# Patient Record
Sex: FEMALE | Race: WHITE | NOT HISPANIC OR LATINO | Employment: OTHER | URBAN - METROPOLITAN AREA
[De-identification: names, ages, dates, MRNs, and addresses within clinical notes are randomized per-mention and may not be internally consistent; named-entity substitution may affect disease eponyms.]

---

## 2017-04-21 ENCOUNTER — TRANSCRIBE ORDERS (OUTPATIENT)
Dept: LAB | Facility: CLINIC | Age: 47
End: 2017-04-21

## 2017-04-21 ENCOUNTER — APPOINTMENT (OUTPATIENT)
Dept: LAB | Facility: CLINIC | Age: 47
End: 2017-04-21
Payer: COMMERCIAL

## 2017-04-21 DIAGNOSIS — G35 MULTIPLE SCLEROSIS (HCC): ICD-10-CM

## 2017-04-21 LAB
ALBUMIN SERPL BCP-MCNC: 3.5 G/DL (ref 3.5–5)
ALP SERPL-CCNC: 73 U/L (ref 46–116)
ALT SERPL W P-5'-P-CCNC: 31 U/L (ref 12–78)
AST SERPL W P-5'-P-CCNC: 28 U/L (ref 5–45)
BASOPHILS # BLD AUTO: 0.03 THOUSANDS/ΜL (ref 0–0.1)
BASOPHILS NFR BLD AUTO: 1 % (ref 0–1)
BILIRUB DIRECT SERPL-MCNC: 0.14 MG/DL (ref 0–0.2)
BILIRUB SERPL-MCNC: 0.4 MG/DL (ref 0.2–1)
EOSINOPHIL # BLD AUTO: 0.05 THOUSAND/ΜL (ref 0–0.61)
EOSINOPHIL NFR BLD AUTO: 1 % (ref 0–6)
ERYTHROCYTE [DISTWIDTH] IN BLOOD BY AUTOMATED COUNT: 13.6 % (ref 11.6–15.1)
HCT VFR BLD AUTO: 43.2 % (ref 34.8–46.1)
HGB BLD-MCNC: 14.3 G/DL (ref 11.5–15.4)
LYMPHOCYTES # BLD AUTO: 1.93 THOUSANDS/ΜL (ref 0.6–4.47)
LYMPHOCYTES NFR BLD AUTO: 31 % (ref 14–44)
MCH RBC QN AUTO: 30.7 PG (ref 26.8–34.3)
MCHC RBC AUTO-ENTMCNC: 33.1 G/DL (ref 31.4–37.4)
MCV RBC AUTO: 93 FL (ref 82–98)
MONOCYTES # BLD AUTO: 0.53 THOUSAND/ΜL (ref 0.17–1.22)
MONOCYTES NFR BLD AUTO: 9 % (ref 4–12)
NEUTROPHILS # BLD AUTO: 3.69 THOUSANDS/ΜL (ref 1.85–7.62)
NEUTS SEG NFR BLD AUTO: 58 % (ref 43–75)
PLATELET # BLD AUTO: 250 THOUSANDS/UL (ref 149–390)
PMV BLD AUTO: 9.9 FL (ref 8.9–12.7)
PROT SERPL-MCNC: 7.7 G/DL (ref 6.4–8.2)
RBC # BLD AUTO: 4.66 MILLION/UL (ref 3.81–5.12)
WBC # BLD AUTO: 6.23 THOUSAND/UL (ref 4.31–10.16)

## 2017-04-21 PROCEDURE — 85025 COMPLETE CBC W/AUTO DIFF WBC: CPT

## 2017-04-21 PROCEDURE — 36415 COLL VENOUS BLD VENIPUNCTURE: CPT

## 2017-04-21 PROCEDURE — 80076 HEPATIC FUNCTION PANEL: CPT

## 2017-04-26 ENCOUNTER — TRANSCRIBE ORDERS (OUTPATIENT)
Dept: ADMINISTRATIVE | Facility: HOSPITAL | Age: 47
End: 2017-04-26

## 2017-04-26 ENCOUNTER — ALLSCRIPTS OFFICE VISIT (OUTPATIENT)
Dept: OTHER | Facility: OTHER | Age: 47
End: 2017-04-26

## 2017-04-26 DIAGNOSIS — G35 MULTIPLE SCLEROSIS (HCC): Primary | ICD-10-CM

## 2017-04-26 DIAGNOSIS — G35 MULTIPLE SCLEROSIS (HCC): ICD-10-CM

## 2017-06-01 ENCOUNTER — GENERIC CONVERSION - ENCOUNTER (OUTPATIENT)
Dept: OTHER | Facility: OTHER | Age: 47
End: 2017-06-01

## 2017-06-02 ENCOUNTER — APPOINTMENT (OUTPATIENT)
Dept: LAB | Facility: CLINIC | Age: 47
End: 2017-06-02
Payer: COMMERCIAL

## 2017-06-02 ENCOUNTER — TRANSCRIBE ORDERS (OUTPATIENT)
Dept: LAB | Facility: CLINIC | Age: 47
End: 2017-06-02

## 2017-06-02 DIAGNOSIS — N95.1 SYMPTOMATIC MENOPAUSAL OR FEMALE CLIMACTERIC STATES: ICD-10-CM

## 2017-06-02 DIAGNOSIS — N95.1 SYMPTOMATIC MENOPAUSAL OR FEMALE CLIMACTERIC STATES: Primary | ICD-10-CM

## 2017-06-02 LAB
FSH SERPL-ACNC: 0.5 MIU/ML
T4 SERPL-MCNC: 11 UG/DL (ref 4.7–13.3)
TSH SERPL DL<=0.05 MIU/L-ACNC: 2.08 UIU/ML (ref 0.36–3.74)

## 2017-06-02 PROCEDURE — 84443 ASSAY THYROID STIM HORMONE: CPT

## 2017-06-02 PROCEDURE — 83001 ASSAY OF GONADOTROPIN (FSH): CPT

## 2017-06-02 PROCEDURE — 84436 ASSAY OF TOTAL THYROXINE: CPT

## 2017-06-02 PROCEDURE — 36415 COLL VENOUS BLD VENIPUNCTURE: CPT

## 2017-06-14 ENCOUNTER — GENERIC CONVERSION - ENCOUNTER (OUTPATIENT)
Dept: OTHER | Facility: OTHER | Age: 47
End: 2017-06-14

## 2017-09-21 ENCOUNTER — TRANSCRIBE ORDERS (OUTPATIENT)
Dept: LAB | Facility: CLINIC | Age: 47
End: 2017-09-21

## 2017-09-21 ENCOUNTER — APPOINTMENT (OUTPATIENT)
Dept: LAB | Facility: CLINIC | Age: 47
End: 2017-09-21
Payer: COMMERCIAL

## 2017-09-21 DIAGNOSIS — G35 MULTIPLE SCLEROSIS (HCC): ICD-10-CM

## 2017-09-21 LAB
ALBUMIN SERPL BCP-MCNC: 3.4 G/DL (ref 3.5–5)
ALP SERPL-CCNC: 56 U/L (ref 46–116)
ALT SERPL W P-5'-P-CCNC: 40 U/L (ref 12–78)
AST SERPL W P-5'-P-CCNC: 27 U/L (ref 5–45)
BASOPHILS # BLD AUTO: 0.02 THOUSANDS/ΜL (ref 0–0.1)
BASOPHILS NFR BLD AUTO: 0 % (ref 0–1)
BILIRUB DIRECT SERPL-MCNC: 0.09 MG/DL (ref 0–0.2)
BILIRUB SERPL-MCNC: 0.4 MG/DL (ref 0.2–1)
BUN SERPL-MCNC: 17 MG/DL (ref 5–25)
CREAT SERPL-MCNC: 0.78 MG/DL (ref 0.6–1.3)
EOSINOPHIL # BLD AUTO: 0.04 THOUSAND/ΜL (ref 0–0.61)
EOSINOPHIL NFR BLD AUTO: 1 % (ref 0–6)
ERYTHROCYTE [DISTWIDTH] IN BLOOD BY AUTOMATED COUNT: 13.4 % (ref 11.6–15.1)
GFR SERPL CREATININE-BSD FRML MDRD: 90 ML/MIN/1.73SQ M
HCT VFR BLD AUTO: 41.9 % (ref 34.8–46.1)
HGB BLD-MCNC: 13.6 G/DL (ref 11.5–15.4)
LYMPHOCYTES # BLD AUTO: 1.61 THOUSANDS/ΜL (ref 0.6–4.47)
LYMPHOCYTES NFR BLD AUTO: 28 % (ref 14–44)
MCH RBC QN AUTO: 30.6 PG (ref 26.8–34.3)
MCHC RBC AUTO-ENTMCNC: 32.5 G/DL (ref 31.4–37.4)
MCV RBC AUTO: 94 FL (ref 82–98)
MONOCYTES # BLD AUTO: 0.51 THOUSAND/ΜL (ref 0.17–1.22)
MONOCYTES NFR BLD AUTO: 9 % (ref 4–12)
NEUTROPHILS # BLD AUTO: 3.5 THOUSANDS/ΜL (ref 1.85–7.62)
NEUTS SEG NFR BLD AUTO: 62 % (ref 43–75)
PLATELET # BLD AUTO: 265 THOUSANDS/UL (ref 149–390)
PMV BLD AUTO: 10.1 FL (ref 8.9–12.7)
PROT SERPL-MCNC: 7.2 G/DL (ref 6.4–8.2)
RBC # BLD AUTO: 4.45 MILLION/UL (ref 3.81–5.12)
WBC # BLD AUTO: 5.68 THOUSAND/UL (ref 4.31–10.16)

## 2017-09-21 PROCEDURE — 85025 COMPLETE CBC W/AUTO DIFF WBC: CPT

## 2017-09-21 PROCEDURE — 36415 COLL VENOUS BLD VENIPUNCTURE: CPT

## 2017-09-21 PROCEDURE — 82565 ASSAY OF CREATININE: CPT

## 2017-09-21 PROCEDURE — 80076 HEPATIC FUNCTION PANEL: CPT

## 2017-09-21 PROCEDURE — 84520 ASSAY OF UREA NITROGEN: CPT

## 2017-10-06 ENCOUNTER — HOSPITAL ENCOUNTER (OUTPATIENT)
Dept: MRI IMAGING | Facility: HOSPITAL | Age: 47
Discharge: HOME | End: 2017-10-06
Attending: PSYCHIATRY & NEUROLOGY
Payer: COMMERCIAL

## 2017-10-06 DIAGNOSIS — G35 MULTIPLE SCLEROSIS (HCC): ICD-10-CM

## 2017-10-06 PROCEDURE — A9585 GADOBUTROL INJECTION: HCPCS | Performed by: PSYCHIATRY & NEUROLOGY

## 2017-10-06 PROCEDURE — 70553 MRI BRAIN STEM W/O & W/DYE: CPT

## 2017-10-06 RX ADMIN — GADOBUTROL 6 ML: 604.72 INJECTION INTRAVENOUS at 08:20

## 2017-10-17 ENCOUNTER — GENERIC CONVERSION - ENCOUNTER (OUTPATIENT)
Dept: OTHER | Facility: OTHER | Age: 47
End: 2017-10-17

## 2017-10-17 DIAGNOSIS — G31.9 DEGENERATIVE DISEASE OF NERVOUS SYSTEM (HCC): ICD-10-CM

## 2017-10-26 ENCOUNTER — GENERIC CONVERSION - ENCOUNTER (OUTPATIENT)
Dept: OTHER | Facility: OTHER | Age: 47
End: 2017-10-26

## 2017-11-06 ENCOUNTER — ALLSCRIPTS OFFICE VISIT (OUTPATIENT)
Dept: OTHER | Facility: OTHER | Age: 47
End: 2017-11-06

## 2018-01-12 VITALS
RESPIRATION RATE: 16 BRPM | HEART RATE: 76 BPM | HEIGHT: 69 IN | DIASTOLIC BLOOD PRESSURE: 70 MMHG | WEIGHT: 165 LBS | SYSTOLIC BLOOD PRESSURE: 110 MMHG | BODY MASS INDEX: 24.44 KG/M2

## 2018-01-14 NOTE — PROGRESS NOTES
Assessment    1  Multiple sclerosis (340) (G35)   2  Right foot drop (736 79) (M21 371)    Plan  Long term use of drug    · (1) VITAMIN D 25-HYDROXY; Status:Active; Requested for:29Mar2016;    Perform:[facility]; LXT:[id number];ZHBJAQE; For:Long term use of drug; Ordered By:Kurt Hurtado; Long term use of drug, Multiple sclerosis    · (1) CBC/PLT/DIFF; Status:Active; Requested for:29Mar2016;    Perform:[facility]; DFW:[id number];ZIKGYPP; For:Long term use of drug, Multiple sclerosis; Ordered By:Kurt Hurtado;   · (1) COMPREHENSIVE METABOLIC PANEL; Status:Active; Requested for:29Mar2016;    Perform:[facility]; PNR:[id number];[id number]; For:Long term use of drug, Multiple sclerosis; Ordered By:Kurt Hurtado;  Multiple sclerosis    · Follow-up visit in 6 months Evaluation and Treatment  Follow-up  Status: Complete   Done: [id number]   Ordered; For: Multiple sclerosis; Ordered By: Fabien Jj Performed:  Due: 96WEL4671; Last Updated By: Herminio Posey; 3/29/2016 1:06:31 PM    Discussion/Summary  Discussion Summary:   Pt overall doing well, clinically stable  On rebif  updated MRI cervical stable  no new lesions, no enh   updated MRI Brain no enh  notes one new subactue lesion compared to 2002 study  Asked  to obtain MRI Disc from 2012 outside study to use for more recent comparison, to be loaded into PACS  pt using bioness stim for the right foot  doing very well   needs updated labs  following with optho in Sept  discussed issues with bowel, suggested bowel program with Fiber, If ongoing issues pt to call will consider GI or neuroGYN for stim  pt to call for any new sxs  Medication Side Effects Reviewed: Possible side effects of new medications were reviewed with the patient/guardian today  Patient Guardian understands agrees: The treatment plan was reviewed with the patient/guardian   The patient/guardian understands and agrees with the treatment plan Counseling Documentation With Imm: The patient, patient's family was counseled regarding diagnostic results, instructions for management, risk factor reductions, prognosis, patient and family education  total time of encounter was 25 minutes and >50 minutes was spent counseling  Chief Complaint  Chief Complaint Free Text Note Form: patient present for f/u appt      History of Present Illness  HPI: 55 y/o female presents for MS follow up  PT DIAGNOSED WITH MS IN 1993 , H/O HYPERCHOLESTEROLEMIA  PT NOTED INTI AL SXS WERE NUMBNESS OF HANDS AND TOES, DIFF WITH BALANCE  PT ALSO WITH DIFF WITH SHORT TERM MEMORY , UNABLE TO WORK JENS DUE TO COGNITIVE DIFF  PT RELIES ON SPOUSE FOR MUCH OF HISTORY AS WELL AS COOKING AND ADLS  LAST IV STEROIDS OVER 12 YEARS AGO, SOME DIFF WITH THE STEROIDS   PT WAS ORIGINALLY ON BETASERON FOR 8 YEARS AT TIME OF DIAGNOSIS, DUE TO DECREASED EFFICACY PT WAS THEN CHANGED TO COPAXONE AND ON THAT MED FOR ABOUT 7 YEARS  PT THEN CHANGED TO REBIF  PT HAD UPDATED LABS ON Feb 2015 CBC & LFTS WNL Pt follows with Dr Hwang of urology for bladder & urinary issues- no change in longstanding issues  PT DOES NOTE SOME INCREASED PAIN IN THE RIGHT LEG  PT LAST ABLE TO RUN ABOUT 1 5 YRS AGO  PT NOTES NO BACK PAIN  PT WITH PRIMARY WEAKNESS OF RIGHT LE, WAS ABLE TO OBTAIN Bioness device WITH GREAT IMPROVEMENT        , last seen in October  At that time clinically stable  Today she is doing well  No s/s of exacerbation  She remains on Rebif, no significant site reaction difficulty  PT has one area of lipoatrophy long standing and pt avoids this area in the right upper arm  She is here with her   She does note some ongoing issues with imbalance, no reported falls  With fatigue she will have a tendency to drag the right foot  She had great success with the Bioness  She states that she has been starting back to do some running   She has ongoing bladder issues she does describe some problems with inability at times to have a complete evacuation of her bowels  No UTI  NO change in her vision, she is due for optho exam in Sept  She did have updated imaging done 11/27/16: Multiple supratentorial white matter T2 and FLAIR hyperintense foci consistent with chronic MS and development of a subacute plaque at the left frontoparietal convexity  No pathologically enhancing lesions identified  This was compared to a prior MRI from 2002  MRI cervical spine Multiple spinal cord lesions consistent with multiple sclerosis  Mild annular bulges C5-6 and C6-7 without evidence of disc herniation, central canal or foraminal stenosis, No pathologic enhancement, Similar to previous MRI study from 2014  pt notes no new sxs  pt denies any vertigo  l  pt is not interested in changed imd meds at this time  pt is due for updated labs  last labs by pcp [facility] in feb 2015  ROS reviewed with Pt  Review of Systems  Neurological ROS:   Constitutional: recent weight loss and fatigue  HEENT: sinus problems  Genitourinary: feelings of urinary urgency  Psychiatric: depression, mood swings and sleep problems  Endocrine menstrual period change or irregularity and loss of sexual ability or drive   Hematologic/Lymphatic: a tendency for easy bruising  Neurological General: increased sleepiness and snoring  Neurological Mental Status: confusion and memory problems  Neurological Cranial Nerves: slurred speech  Neurological Coordination: balance difficulties  Neurological Sensory: numbness and tingling  Neurological Gait: difficulty walking and has had falls  Active Problems    1  Abnormal gait (781 2) (R26 9)   2  Acute sinusitis (461 9) (J01 90)   3  Flu vaccine need (V04 81) (Z23)   4  Long term use of drug (V58 69) (Z79 899)   5  Multiple sclerosis (340) (G35)   6  Neurogenic bladder (596 54) (N31 9)   7  Right foot drop (736 79) (M21 371)   8  Transient amnesia (780 93) (R41 3)   9   Urinary frequency (788 41) (R35 0)   10  Urinary tract infection (599 0) (N39 0)   11  Weight loss (783 21) (R63 4)    Surgical History    1  History of Oral Surgery Tooth Extraction    Family History    1  Family history of Heart Disease (V17 49)   2  Family history of Hypertension   3  Family history of Liver, Stomach, Or Bowel Disease    4  Family history of Colon cancer, ascending   5  Family history of DM2 (diabetes mellitus, type 2)    Social History    · Being A Social Drinker   · Caffeine Use   · Daily Cola Consumption (1  Cans/Day)   · Denied: History of Drug Use   · Marital History - Currently    · Never A Smoker    Current Meds   1  Fish Oil 1200 MG Oral Capsule; Therapy: (Recorded:06May2015) to Recorded   2  Nitrofurantoin Monohyd Macro 100 MG Oral Capsule; 100 mg after intercourse to   prevent/treat infection; Therapy: [id number] to (Jluis Godwin)  Requested for: 82OWS2349; Last   Rx:09Oct2015 Ordered   3  Beatriz-28 TABS; Therapy: (Recorded:08Jan2014) to Recorded   4  Rebif 44 MCG/0 5ML SOLN; INJECT 0 5 ML 3 TIMES WEEKLY AS DIRECTED; Last   Rx:[id number] Ordered   5  Vitamin B12 TABS; Therapy: (Recorded:11Bli3054) to Recorded   6  Vitamin C TABS; Therapy: (Recorded:08Jan2014) to Recorded   7  Vitamin D TABS; Therapy: (Recorded:02May2014) to Recorded    Allergies    1  Cleocin CAPS   2  Penicillins    Vitals  Signs [Data Includes: Current Encounter]   Recorded: 30CTS0587 12:24PM   Temperature: 98 1 F  Heart Rate: 98  Respiration: 16  Systolic: 832, LUE, Sitting  Diastolic: 72, LUE, Sitting  Height: 5 ft 9 in  Weight: 153 lb   BMI Calculated: 22 59  BSA Calculated: 1 84  O2 Saturation: 98    Physical Exam    Constitutional   General Appearance: Appears appropriate for age, healthy, well developed, appropriately groomed and appropriately dressed    Eyes   Ophthalmoscopic examination: Vision is grossly normal  Gross visual field testing by confrontation shows no abnormalities  EOMI in both eyes   Conjunctivae clear  Eyelids normal palpebral fissures equal  Orbits exhibit normal position  No discharge from the eyes  PERRL  External inspection of ears and nose: Normal       Neck   Neck and thyroid: Normal to inspection and palpation    Cardiovascular   Auscultation of heart: Rate is regular  Rhythm is regular   Peripheral vascular exam: Normal pulses throughout, no tenderness, erythema or swelling  Musculoskeletal wide based  Muscle strength: Abnormal     Strength examination:   Biceps strength was 5/5 on the right side and 5/5 on the left side  Triceps strength was 5/5 on the right side and 5/5 on the left side  Shoulder flexion was 5/5 on the right side and 5/5 on the left side  WEAKNESS OF LOWER EXT 5-/5;  Muscle tone: Abnormal   increased in lowers  Involuntary movements: None observed  Neurologic   Mental Status: Abnormal  difficulty with 4 part commands and short term memory  Motor System: No pronator drift  Reflexes: Abnormal   increased in lowers  Coordination: Cerebellum function intact  No involuntary movement or psychomotor activity  Sensation: Abnormal   decreased temperature left LE  Gait and Station: Steens with normal gait  Tandem walk test is normal  Romberg's test is negative    Cortical function: Normal     Judgment and insight: Normal     Cranial Nerve Exam   II: Normal with no deficit  III,IV, VI:Normal with no deficit    V: Shelby with no deficitl  VII: Normal with no deficit  VIII: Normal with no deficit  XI: Normal with no deficit  Constitutional   General appearance: No acute distress, well appearing and well nourished  Eyes   Conjunctiva and lids: No erythema, swelling or discharge  Recent and remote memory: Intact      Mood and affect: Normal        Results/Data  Results   * MRI Brain With and Without Contrast [id number] 08:52AM Lainaadina Zarcobran     Test Name Result Flag Reference    [address] Ave [address] W/O (Report)     [facility];[address] [facility];;[place];[id number]   11/27/2015 0900   11/27/2015 1020   N/A     MRI BRAIN WITH AND WITHOUT CONTRAST     INDICATION- 58-year-old female, MS, memory loss   COMPARISON- 11/18/2002 MRI     TECHNIQUE-   Sagittal T1, axial T2, axial FLAIR, axial T1, axial gradient imaging,   axial diffusion  Sagittal, axial and coronal T1 weighted images were   obtained  Exam was performed pre- and postintravenous administration of   7 cc Gadavist      IMAGE QUALITY-  Diagnostic  FINDINGS-     BRAIN PARENCHYMA-    Numerous T2 and FLAIR hyperintense foci are present primarily within   the periventricular and pericallosal white matter of the frontal and   parietal lobes bilaterally, unchanged for the most part  A new lesion   has developed measuring approximately 8 mm at the subcortical white   matter of the left frontoparietal convexity  This lesion exhibits mild   diffusion hyperintensity  No pathologically enhancing lesions are   identified  There is no discrete mass effect or midline shift  Brainstem and   cerebellum demonstrate normal signal       VENTRICLES- Normal      POSTCONTRAST IMAGING-    Postcontrast imaging of the brain demonstrates no abnormal enhancement  SELLA AND PITUITARY GLAND- Normal      ORBITS- Normal      PARANASAL SINUSES- The paranasal sinuses are clear  VASCULATURE-    Evaluation of the major intracranial vasculature demonstrates   appropriate flow voids  CALVARIUM AND SKULL BASE- Normal      EXTRACRANIAL SOFT TISSUES- Normal      IMPRESSION-   Multiple supratentorial white matter T2 and FLAIR hyperintense foci   consistent with chronic MS and development of a subacute plaque at the   left frontoparietal convexity       No pathologically enhancing lesions identified             Transcribed on- [id number]     - NAZANIN Smith MD   Reading Radiologist- NAZANIN Smith MD   Electronically Signed- NAZANIN Smith MD   Released Date Time- 11/27/15 1301   ------------------------------------------------------------------------------   [id number]^MUNA VILCHIS   09255^MUNA VILCHIS     * MRI Spine Cervical With and Without Contrast [id number] 08:52AM Olita Round     Test Name Result Flag Reference   MRI CSpine  W/ & W/O (Report)     [facility] [address];;[place];[id number]   11/27/2015 0900   11/27/2015 1020   N/A     MRI CERVICAL SPINE WITH AND WITHOUT CONTRAST     INDICATION- MS, follow-up     COMPARISON- 1/24/2014 MRI     TECHNIQUE- Sagittal T1, sagittal T2, sagittal inversion recovery,   axial 2D merge and axial T2  Sagittal T1 and axial T1   postcontrast  7 mL of Gadavist was administered without immediate   consequence  IMAGE QUALITY- Diagnostic  FINDINGS-     ALIGNMENT- Normal alignment of the cervical spine  No compression   fracture  No subluxation  No scoliosis  MARROW SIGNAL- Normal marrow signal is identified within the   visualized bony structures  No discrete marrow lesion  CERVICAL AND VISUALIZED UPPER THORACIC CORD-      T2 hyperintense lesions consistent with MS are present within the   posterior aspect of the spinal cord at C1-2 and C2-3 levels ]    Posterior spinal cord lesion at C5  Additional lesion left posterior   C6-7  These findings are stable  No lesion enhancement  PREVERTEBRAL AND PARASPINAL SOFT TISSUES- Prevertebral and paraspinal   soft tissues are unremarkable  VISUALIZED POSTERIOR FOSSA- The visualized posterior fossa   demonstrates no abnormal signal      CERVICAL DISC SPACES-        C2-C3- Normal      C3-C4- Normal      C4-C5- Normal      C5-C6- Mild bulging annulus, no stenosis, stable     C6-C7- Mild bulging annulus, no stenosis, stable     C7-T1- Normal      UPPER THORACIC DISC SPACES- Normal      POSTCONTRAST IMAGING- Normal      IMPRESSION-   Multiple spinal cord lesions consistent with multiple sclerosis        Mild annular bulges C5-6 and C6-7 without evidence of disc herniation,   central canal or foraminal stenosis     No pathologic enhancement     Similar to previous MRI study           Transcribed on- [id number]     - NAZANIN Pappas MD   Reading Radiologist- NAZANIN Pappas MD   Electronically Signed- NAZANIN Pappas MD   Released Date Time- 11/27/15 1306   ------------------------------------------------------------------------------   28663^MUNA VILCHIS   30421^MUNA VILCHIS     Attending Note  Collaborating Physician Note: Collaborating Note: I agree with the Advanced Practitioner note  Future Appointments    Date/Time Provider Specialty Site   10/17/2016 09:00 AM JULIUS Latif   Neurology [facility]     Signatures   Electronically signed by : Simran Reich; Mar 29 2016  4:34PM EST                       (Author)    Electronically signed by : JULIUS Carver ; Apr  3 2016 10:05AM EST                       (Co-author)

## 2018-01-16 NOTE — PROGRESS NOTES
Chief Complaint  pt is here for flu vaccine      Active Problems    1  Abnormal gait (781 2) (R26 9)   2  Acute sinusitis (461 9) (J01 90)   3  Chronic constipation (564 00) (K59 09)   4  Diffuse cerebral atrophy (331 9) (G31 9)   5  Disturbance of memory (780 93) (R41 3)   6  Flu vaccine need (V04 81) (Z23)   7  Hip pain, right (719 45) (M25 551)   8  Long term use of drug (V58 69) (Z79 899)   9  Multiple sclerosis (340) (G35)   10  Neurogenic bladder (596 54) (N31 9)   11  Right foot drop (736 79) (M21 371)   12  Strain of ankle, right (845 00) (S96 911A)   13  Transient amnesia (780 93) (R41 3)   14  Urinary frequency (788 41) (R35 0)   15  Urinary tract infection (599 0) (N39 0)   16  Weight loss (783 21) (R63 4)    Current Meds   1  Fish Oil 1200 MG Oral Capsule; Therapy: (Recorded:06May2015) to Recorded   2  Nitrofurantoin Monohyd Macro 100 MG Oral Capsule; 100 mg after intercourse to   prevent/treat infection; Therapy: 54DKT8205 to (Patrice Garcia)  Requested for: 02EIW9780; Last   Rx:09Oct2015 Ordered   3  Beatriz-28 TABS; Therapy: (Recorded:08Jan2014) to Recorded   4  Rebif 44 MCG/0 5ML Subcutaneous Solution Prefilled Syringe; INJECT 44 MCG (0 5 ML)   UNDER THE SKIN THREE TIMES A WEEK AS DIRECTED; Therapy: 99LEP8203 to (Last LK:74IYQ2245)  Requested for: 73PTI7520 Ordered   5  Vitamin B12 TABS; Therapy: (Recorded:35Sap6713) to Recorded   6  Vitamin C TABS; Therapy: (Recorded:08Jan2014) to Recorded   7  Vitamin D TABS; Therapy: (Recorded:39Dgh6100) to Recorded    Allergies    1  Cleocin CAPS   2  Penicillins    Plan  Need for influenza vaccination    · Fluzone Quadrivalent Intramuscular Suspension    Future Appointments    Date/Time Provider Specialty Site   03/21/2018 10:30 AM JULIUS Bejarano  Neurology [address] [facility]   05/14/2018 01:00 PM JULIUS Bejarano   Neurology [address]     Signatures   Electronically signed by : German Keller, ; Nov 6 2017 10:04AM EST                       (Author)    Electronically signed by : Laura Garcia DO; Nov 6 2017 10:43AM EST                       (Author)

## 2018-01-22 VITALS
HEART RATE: 87 BPM | BODY MASS INDEX: 23.96 KG/M2 | SYSTOLIC BLOOD PRESSURE: 139 MMHG | HEIGHT: 69 IN | RESPIRATION RATE: 15 BRPM | WEIGHT: 161.75 LBS | DIASTOLIC BLOOD PRESSURE: 74 MMHG

## 2018-03-22 ENCOUNTER — TELEPHONE (OUTPATIENT)
Dept: NEUROLOGY | Facility: CLINIC | Age: 48
End: 2018-03-22

## 2018-04-05 ENCOUNTER — TELEPHONE (OUTPATIENT)
Dept: NEUROLOGY | Facility: CLINIC | Age: 48
End: 2018-04-05

## 2018-04-05 ENCOUNTER — DOCUMENTATION (OUTPATIENT)
Dept: NEUROLOGY | Facility: CLINIC | Age: 48
End: 2018-04-05

## 2018-04-05 NOTE — PROGRESS NOTES
4/5/2018 - initiated auth with [address] [place] [address] spoke with Desire Villegas, who directed me to behavioral health department  I was transferred and spoke to [facility]  She gave me  A call ref  Number #[id number] - she is faxing me a application to [phone]  Once I receive it I submit to insurance

## 2018-04-07 DIAGNOSIS — G35 MULTIPLE SCLEROSIS (HCC): Primary | ICD-10-CM

## 2018-04-09 RX ORDER — INTERFERON BETA-1A 44 UG/.5ML
INJECTION, SOLUTION SUBCUTANEOUS
Qty: 6 ML | Refills: 9 | Status: SHIPPED | OUTPATIENT
Start: 2018-04-09 | End: 2019-03-09 | Stop reason: SDUPTHER

## 2018-04-18 ENCOUNTER — OFFICE VISIT (OUTPATIENT)
Dept: NEUROLOGY | Facility: CLINIC | Age: 48
End: 2018-04-18
Payer: MEDICARE

## 2018-04-18 VITALS
SYSTOLIC BLOOD PRESSURE: 136 MMHG | HEART RATE: 86 BPM | WEIGHT: 158 LBS | DIASTOLIC BLOOD PRESSURE: 78 MMHG | BODY MASS INDEX: 23.33 KG/M2 | RESPIRATION RATE: 15 BRPM

## 2018-04-18 DIAGNOSIS — K59.9 BOWEL DYSFUNCTION: ICD-10-CM

## 2018-04-18 DIAGNOSIS — G35 MULTIPLE SCLEROSIS (HCC): Primary | ICD-10-CM

## 2018-04-18 PROCEDURE — 99214 OFFICE O/P EST MOD 30 MIN: CPT | Performed by: PSYCHIATRY & NEUROLOGY

## 2018-04-18 RX ORDER — NITROFURANTOIN 25; 75 MG/1; MG/1
CAPSULE ORAL
COMMUNITY
Start: 2015-06-16 | End: 2019-04-18 | Stop reason: SDUPTHER

## 2018-04-18 RX ORDER — LEVONORGESTREL AND ETHINYL ESTRADIOL 0.15-0.03
KIT ORAL
COMMUNITY

## 2018-04-18 RX ORDER — AMOXICILLIN 500 MG
CAPSULE ORAL
COMMUNITY

## 2018-04-18 NOTE — PROGRESS NOTES
Patient ID: Peterson Otto is a 49 y o  female  Assessment/Plan:    Multiple sclerosis (HCC)  PT LAST SEEN IN OCT  PT NOTES NO NEW SXS  PT STILL PENDING ON EVAL WITH DR GURROLA [facility] ON 5/15/18  PT WITH RECENT STRESSOR WITH DEATH OF MOTHER IN LAW THIS MONTH  PT DID NOT HAVE UPDATED LABS YET FOR HER CBC AND LFTS  PT TO CALL ABOUT 3 DAYS AFTER LABS FOR RESULTS  PT REMAINS ON REBIF AND [facility]  PT DUE FOR HER NEUROCOGNITIVE TESTING NEXT MONTH WHICH CAN BE COMPARED TO TESTING YRS AGO  THIS IS ALREADY SET UP WITH DR GURROLA [facility]       Diagnoses and all orders for this visit:    Multiple sclerosis ([facility] [address] )  -     CBC and differential; Future  -     Hepatic function panel; Future  -     Ambulatory referral to Gastroenterology; Future    Bowel dysfunction  -     Ambulatory referral to Gastroenterology; Future    Other orders  -     Omega-3 Fatty Acids (FISH OIL) 1200 MG CAPS; Take by mouth  -     nitrofurantoin (MACROBID) 100 mg capsule; Take by mouth  -     levonorgestrel-ethinyl estradiol (RAMIN-28) 0 15-30 MG-MCG per tablet; Take by mouth  -     Cyanocobalamin (VITAMIN B-12 PO); Take by mouth  -     Cholecalciferol (VITAMIN D PO); Take by mouth           Subjective:    HPI  HPI: 50 y/o female presents for MS follow up  PT LAST SEEN IN OCT 2017  PT DIAGNOSED WITH MS IN 1993 , H/O HYPERCHOLESTEROLEMIA  PT NOTED INTI AL SXS WERE NUMBNESS OF HANDS AND TOES, DIFF WITH BALANCE  PT ALSO WITH DIFF WITH SHORT TERM MEMORY , UNABLE TO WORK JENS DUE TO COGNITIVE DIFF  PT RELIES ON SPOUSE FOR MUCH OF HISTORY AS WELL AS COOKING AND ADLS  LAST IV STEROIDS OVER 12 YEARS AGO, SOME DIFF WITH THE STEROIDS   PT WAS ORIGINALLY ON BETASERON FOR 8 YEARS AT TIME OF DIAGNOSIS, DUE TO DECREASED EFFICACY PT WAS THEN CHANGED TO COPAXONE AND ON THAT MED FOR ABOUT 7 YEARS  PT THEN CHANGED TO REBIF   DUE TO MULTIPLICITY OF MEDS, NEED TO KEEP THIS PORTION OF HISTORY ON FILE FOR FUTURE MANAGEMENT  PT HAS REMAINED ON HER REBIF AND [facility]    PT NOTES SINCE LAST VISIT OVERALL GOOD  NO NEW SXS  NO FALLS  PT IS OCC CLUTZY  OCC BALANCE ISSUES  PT NOTES NO RECENT INFECTIONS  BLADDER ABOUT THE SAME  PT NOTES ISSUES LONG STANDING WITH HER BOWELS  REC PT TO SEE Gi  PT SEEN IN THE PAST AND DID NOT LIKE BEING ON LAXATIVES  PT IS OVERDUE FOR LABS  PT NOTES THEY FORGOT TO DO THEM  BASED ON HER ISSUES WITH MEMORY AND CONCENTRATION AND LAST MMSE OF 19/30, PT HAS AN UPCOMING NEUROCOGNITIVE EVAL WITH  [facility] IN MAY  REMINDED PT OF APPT AND THEY ARE PLANNING ON ATTENDING BOTH PT AND SPOUSE  PT WILL ALSO GET LABS DONE THIS MONTH  PT WITH SADNESS THIS MONTH WITH DEATH OF HER MOTHER IN LAW  THIS AS A SHOCK TO THE FAMILY AND ALL GRIEVING HER LOSS  PT HAD UPDATED LABS IN OCT 2017  CBC AND LFTS GOOD  RE REV LAST MRI FROM OCT 6 2017  UNCHANGED FROM MS STANDPOINT, THERE IS EVIDENCE OF GLOBAL CEREBRAL ATROPHY, GREATER THAN EXPECTED FOR PT STATED AGE  PT HAD A PRIOR NEUROCOGNITIVE EVAL MANY YRS AGO NEAR TIME OF DIAGNOSIS  PT SPOUSE HAS A COPY OF THAT REPORT AND WILL BRING TO UPDATED NEUROCOGNITIVE APPT  PT IS NOT DRIVING  PT DOES EVERYTHING WITH SPOUSE IE SHOPPING  SPOUSE DOES THE BILLS  THIS HAS BEEN LONG STANDING FOR YRS  PT NOTES VISION IS STABLE  PT NOTES OCC VESTIBULAR ISSUES WITH QUICK MOVEMENT OF THE EYES  PT DENIES ANY LOC  NO SZ  NO CHANGE IN BALANCE  REV WITH PT NEWEST MEDS INCLUDING OCREVUS AT APPT TODAY  PT OVERALL CONT TO [facility] HER REBIF  NO RECENT VERTIGO  NO N OR V   NO DIPLOPIA  Total time spent today 25  minutes  Greater than 50% of total time was spent with the patient and / or family counseling and / or coordination of care             The following portions of the patient's history were reviewed and updated as appropriate: allergies, current medications, past family history, past medical history, past social history, past surgical history and problem list          Objective:    Blood pressure 136/78, pulse 86, resp   rate 15, weight 71 7 kg (158 lb)     Physical Exam   Constitutional: She appears well-developed and well-nourished  Eyes: EOM are normal  Pupils are equal, round, and reactive to light  Cardiovascular: Intact distal pulses  Neurological: She has normal strength and normal reflexes  Psychiatric: Her speech is normal        Neurological Exam    Mental Status  The patient is alert and oriented to person, place, time, and situation  Her recent and remote memory are normal  Her speech is normal  Her language is fluent with no aphasia  She has poor concentration  She has poor knowledge  Cranial Nerves    CN II: The patient's visual acuity and visual fields are normal   CN III, IV, VI: The patient's pupils are equally round and reactive to light and ocular movements are normal   CN V: The patient has normal facial sensation  CN VII:  The patient has symmetric facial movement  CN VIII:  The patient's hearing is normal   CN IX, X: The patient has symmetric palate movement and normal gag reflex  CN XI: The patient's shoulder shrug strength is normal   CN XII: The patient's tongue is midline without atrophy or fasciculations  Motor  The patient has normal muscle bulk throughout  Her overall muscle tone is normal throughout  Her strength is 5/5 throughout all four extremities  Sensory    DEC VIB SHELDON LOWERS     Reflexes  Deep tendon reflexes are 2+ and symmetric in all four extremities with downgoing toes bilaterally  Gait and Coordination   She has a wide stance  ROS:    Review of Systems   Constitutional: Positive for fatigue  Negative for appetite change and fever  HENT: Positive for sinus pain and sinus pressure  Negative for hearing loss, tinnitus, trouble swallowing and voice change  Eyes: Negative  Negative for photophobia and pain  Respiratory: Negative  Negative for shortness of breath  Cardiovascular: Negative  Negative for palpitations  Gastrointestinal: Negative  Negative for nausea and vomiting  Endocrine: Negative  Negative for cold intolerance and heat intolerance  Genitourinary: Positive for frequency and urgency  Negative for dysuria  Musculoskeletal: Positive for gait problem  Negative for myalgias and neck pain  Falls  Clumsiness  Difficulty walking  Balance problems   Skin: Negative  Negative for rash  Neurological: Positive for speech difficulty and numbness  Negative for dizziness, tremors, seizures, syncope, facial asymmetry, weakness, light-headedness and headaches  Hematological: Bruises/bleeds easily  Psychiatric/Behavioral: Positive for confusion and sleep disturbance  Negative for hallucinations          Depression  Mood swings  Increased sleepiness  Snoring

## 2018-04-18 NOTE — ASSESSMENT & PLAN NOTE
PT LAST SEEN IN OCT  PT NOTES NO NEW SXS  PT STILL PENDING ON EVAL [facility] ON 5/15/18  PT WITH RECENT STRESSOR WITH DEATH OF MOTHER IN LAW THIS MONTH  PT DID NOT HAVE UPDATED LABS YET FOR HER CBC AND LFTS  PT TO CALL ABOUT 3 DAYS AFTER LABS FOR RESULTS  PT REMAINS ON [facility] AND [facility]  PT DUE FOR HER NEUROCOGNITIVE TESTING NEXT MONTH WHICH CAN BE COMPARED TO TESTING YRS AGO  THIS IS ALREADY SET UP WITH DR GURROLA [facility]

## 2018-04-18 NOTE — PATIENT INSTRUCTIONS
Multiple sclerosis (HCC)  PT LAST SEEN IN OCT  PT NOTES NO NEW SXS  PT STILL PENDING ON EVAL WITH  [facility] ON 5/15/18  PT WITH RECENT STRESSOR WITH DEATH OF MOTHER IN LAW THIS MONTH  PT DID NOT HAVE UPDATED LABS YET FOR HER CBC AND LFTS  PT TO CALL ABOUT 3 DAYS AFTER LABS FOR RESULTS  PT REMAINS ON REBIF AND [facility]

## 2018-04-23 ENCOUNTER — APPOINTMENT (OUTPATIENT)
Dept: LAB | Facility: CLINIC | Age: 48
End: 2018-04-23
Payer: COMMERCIAL

## 2018-04-23 ENCOUNTER — TRANSCRIBE ORDERS (OUTPATIENT)
Dept: LAB | Facility: CLINIC | Age: 48
End: 2018-04-23

## 2018-04-23 DIAGNOSIS — G35 MULTIPLE SCLEROSIS (HCC): ICD-10-CM

## 2018-04-23 LAB
ALBUMIN SERPL BCP-MCNC: 3.6 G/DL (ref 3.5–5)
ALP SERPL-CCNC: 67 U/L (ref 46–116)
ALT SERPL W P-5'-P-CCNC: 42 U/L (ref 12–78)
AST SERPL W P-5'-P-CCNC: 29 U/L (ref 5–45)
BASOPHILS # BLD AUTO: 0.02 THOUSANDS/ΜL (ref 0–0.1)
BASOPHILS NFR BLD AUTO: 0 % (ref 0–1)
BILIRUB DIRECT SERPL-MCNC: 0.12 MG/DL (ref 0–0.2)
BILIRUB SERPL-MCNC: 0.4 MG/DL (ref 0.2–1)
EOSINOPHIL # BLD AUTO: 0.04 THOUSAND/ΜL (ref 0–0.61)
EOSINOPHIL NFR BLD AUTO: 1 % (ref 0–6)
ERYTHROCYTE [DISTWIDTH] IN BLOOD BY AUTOMATED COUNT: 13.4 % (ref 11.6–15.1)
HCT VFR BLD AUTO: 41.9 % (ref 34.8–46.1)
HGB BLD-MCNC: 13.8 G/DL (ref 11.5–15.4)
LYMPHOCYTES # BLD AUTO: 2.08 THOUSANDS/ΜL (ref 0.6–4.47)
LYMPHOCYTES NFR BLD AUTO: 30 % (ref 14–44)
MCH RBC QN AUTO: 30.7 PG (ref 26.8–34.3)
MCHC RBC AUTO-ENTMCNC: 32.9 G/DL (ref 31.4–37.4)
MCV RBC AUTO: 93 FL (ref 82–98)
MONOCYTES # BLD AUTO: 0.41 THOUSAND/ΜL (ref 0.17–1.22)
MONOCYTES NFR BLD AUTO: 6 % (ref 4–12)
NEUTROPHILS # BLD AUTO: 4.43 THOUSANDS/ΜL (ref 1.85–7.62)
NEUTS SEG NFR BLD AUTO: 63 % (ref 43–75)
PLATELET # BLD AUTO: 261 THOUSANDS/UL (ref 149–390)
PMV BLD AUTO: 9.5 FL (ref 8.9–12.7)
PROT SERPL-MCNC: 7.6 G/DL (ref 6.4–8.2)
RBC # BLD AUTO: 4.5 MILLION/UL (ref 3.81–5.12)
WBC # BLD AUTO: 6.98 THOUSAND/UL (ref 4.31–10.16)

## 2018-04-23 PROCEDURE — 85025 COMPLETE CBC W/AUTO DIFF WBC: CPT

## 2018-04-23 PROCEDURE — 80076 HEPATIC FUNCTION PANEL: CPT

## 2018-04-23 PROCEDURE — 36415 COLL VENOUS BLD VENIPUNCTURE: CPT

## 2018-05-03 ENCOUNTER — OFFICE VISIT (OUTPATIENT)
Dept: GASTROENTEROLOGY | Facility: AMBULARY SURGERY CENTER | Age: 48
End: 2018-05-03
Payer: COMMERCIAL

## 2018-05-03 VITALS
DIASTOLIC BLOOD PRESSURE: 60 MMHG | SYSTOLIC BLOOD PRESSURE: 114 MMHG | HEART RATE: 83 BPM | TEMPERATURE: 98.6 F | HEIGHT: 70 IN | WEIGHT: 157 LBS | BODY MASS INDEX: 22.48 KG/M2

## 2018-05-03 DIAGNOSIS — K59.00 CONSTIPATION, UNSPECIFIED CONSTIPATION TYPE: Primary | ICD-10-CM

## 2018-05-03 PROCEDURE — 99244 OFF/OP CNSLTJ NEW/EST MOD 40: CPT | Performed by: INTERNAL MEDICINE

## 2018-05-03 NOTE — LETTER
May 3, 2018     Nano Maria MD  [address] [address]    Patient: Yasir Moeller   YOB: 1970   Date of Visit: 5/3/2018       Dear Dr Do Giraldo:    Thank you for referring Luma Rod to me for evaluation  Below are my notes for this consultation  If you have questions, please do not hesitate to call me  I look forward to following your patient along with you           Sincerely,        Marylene Alstrom, MD        CC: No Recipients

## 2018-05-03 NOTE — ASSESSMENT & PLAN NOTE
Chronic constipation-appear to be most likely from pelvic floor dysfunction which could be possible from multiple sclerosis  -will put her on Amitiza twice daily    -advised about high-fiber diet, MiraLax and drink plenty of liquids    -refer her to urogynecology    -schedule for colonoscopy    -Patient was explained about  the risks and benefits of the procedure  Risks including but not limited to bleeding, infection, perforation were explained in detail  Also explained about less than 100% sensitivity with the exam and other alternatives

## 2018-05-03 NOTE — PROGRESS NOTES
Consultation - [facility]  Darren Gambino 1970 female         Chief Complaint:  Constipation    HPI:  54-year-old female with history of multiple sclerosis reports having problems with constipation for couple of years  She was told her problems are because of MS  She strains hard and sometimes pushes through her vagina  She was taking Dulcolax with some help before  Good appetite, no recent weight loss  She is complaining about bloating sometimes  Denies any heartburn acid reflux  Denies any difficulty swallowing  REVIEW OF SYSTEMS: Review of Systems   Constitutional: Negative for activity change, appetite change, chills, diaphoresis, fatigue, fever and unexpected weight change  HENT: Negative for ear discharge, ear pain, facial swelling, hearing loss, nosebleeds, sore throat, tinnitus and voice change  Eyes: Negative for pain, discharge, redness, itching and visual disturbance  Respiratory: Negative for apnea, cough, chest tightness, shortness of breath and wheezing  Cardiovascular: Negative for chest pain and palpitations  Gastrointestinal:        As noted in HPI   Endocrine: Negative for cold intolerance, heat intolerance and polyuria  Genitourinary: Negative for difficulty urinating, dysuria, flank pain, hematuria and urgency  Musculoskeletal: Negative for arthralgias, back pain, gait problem, joint swelling and myalgias  Skin: Negative for rash and wound  Neurological: Negative for dizziness, tremors, seizures, speech difficulty, light-headedness, numbness and headaches  Hematological: Negative for adenopathy  Does not bruise/bleed easily  Psychiatric/Behavioral: Negative for agitation, behavioral problems and confusion  The patient is not nervous/anxious           Past Medical History:   Diagnosis Date    Multiple sclerosis [facility])       Past Surgical History:   Procedure Laterality Date    WISDOM TOOTH EXTRACTION       Social History     Social History  Marital status: /Civil Union     Spouse name: N/A    Number of children: N/A    Years of education: N/A     Occupational History    Not on file  Social History Main Topics    Smoking status: Never Smoker    Smokeless tobacco: Never Used    Alcohol use Yes      Comment: occassionally    Drug use: No    Sexual activity: Not on file     Other Topics Concern    Not on file     Social History Narrative    No narrative on file     Family History   Problem Relation Age of Onset    Addiction problem Father      alcohol    Liver disease Father      Clindamycin and Penicillins  Current Outpatient Prescriptions   Medication Sig Dispense Refill    Cholecalciferol (VITAMIN D PO) Take by mouth      Cyanocobalamin (VITAMIN B-12 PO) Take by mouth      levonorgestrel-ethinyl estradiol (RAMIN-28) 0 15-30 MG-MCG per tablet Take by mouth      nitrofurantoin (MACROBID) 100 mg capsule Take by mouth      Omega-3 Fatty Acids (FISH OIL) 1200 MG CAPS Take by mouth      REBIF 44 MCG/0 5ML SOSY INJECT 44 MCG (0 5 ML) UNDER THE SKIN THREE TIMES A WEEK AS DIRECTED 6 mL 9    bisacodyl (DULCOLAX) 5 mg EC tablet Take 2 tablets (10 mg total) by mouth once for 1 dose 2 tablet 0    lubiprostone (AMITIZA) 24 mcg capsule Take 1 capsule (24 mcg total) by mouth 2 (two) times a day with meals 60 capsule 6    polyethylene glycol (GOLYTELY) 4000 mL solution Take 4,000 mL by mouth once for 1 dose 4000 mL 0     No current facility-administered medications for this visit  Blood pressure 114/60, pulse 83, temperature 98 6 °F (37 °C), temperature source Tympanic, height 5' 10" (1 778 m), weight 71 2 kg (157 lb)  PHYSICAL EXAM: Physical Exam   Constitutional: She is oriented to person, place, and time  She appears well-developed and well-nourished  HENT:   Head: Normocephalic and atraumatic     Nose: Nose normal    Mouth/Throat: Oropharynx is clear and moist    Eyes: Conjunctivae are normal  Right eye exhibits no discharge  Left eye exhibits no discharge  No scleral icterus  Neck: Neck supple  No JVD present  No tracheal deviation present  No thyromegaly present  Cardiovascular: Normal rate, regular rhythm and normal heart sounds  Exam reveals no gallop and no friction rub  No murmur heard  Pulmonary/Chest: Effort normal and breath sounds normal  No respiratory distress  She has no wheezes  She has no rales  She exhibits no tenderness  Abdominal: Soft  Bowel sounds are normal  She exhibits no distension and no mass  There is no tenderness  There is no rebound and no guarding  No hernia  Musculoskeletal: She exhibits no edema, tenderness or deformity  Lymphadenopathy:     She has no cervical adenopathy  Neurological: She is alert and oriented to person, place, and time  Skin: Skin is warm and dry  No rash noted  No erythema  Psychiatric: She has a normal mood and affect  Her behavior is normal  Thought content normal         Lab Results   Component Value Date    WBC 6 98 04/23/2018    HGB 13 8 04/23/2018    HCT 41 9 04/23/2018    MCV 93 04/23/2018     04/23/2018     Lab Results   Component Value Date    BUN 17 09/21/2017    CREATININE 0 78 09/21/2017     Lab Results   Component Value Date    ALT 42 04/23/2018    AST 29 04/23/2018    ALKPHOS 67 04/23/2018    BILITOT 0 40 04/23/2018     No results found for: INR, PROTIME    Mri Brain Ms Wo And W Contrast    Result Date: 10/6/2017  Impression: Multiple supratentorial white matter foci consistent with chronic MS, unchanged in distribution  No pathologic enhancement or diffusion hyperintensity  Global atrophy, greater than expected for patient's stated age    Workstation performed: [id number]       ASSESSMENT & PLAN:    Constipation  Chronic constipation-appear to be most likely from pelvic floor dysfunction which could be possible from multiple sclerosis      -will put her on Amitiza twice daily    -advised about high-fiber diet, MiraLax and drink plenty of liquids    -refer her to urogynecology    -schedule for colonoscopy    -Patient was explained about  the risks and benefits of the procedure  Risks including but not limited to bleeding, infection, perforation were explained in detail  Also explained about less than 100% sensitivity with the exam and other alternatives

## 2018-05-30 ENCOUNTER — ANESTHESIA EVENT (OUTPATIENT)
Dept: PERIOP | Facility: AMBULARY SURGERY CENTER | Age: 48
End: 2018-05-30
Payer: COMMERCIAL

## 2018-05-31 ENCOUNTER — TRANSCRIBE ORDERS (OUTPATIENT)
Dept: ADMINISTRATIVE | Facility: HOSPITAL | Age: 48
End: 2018-05-31

## 2018-05-31 DIAGNOSIS — Z12.31 VISIT FOR SCREENING MAMMOGRAM: Primary | ICD-10-CM

## 2018-06-01 ENCOUNTER — HOSPITAL ENCOUNTER (OUTPATIENT)
Dept: RADIOLOGY | Facility: HOSPITAL | Age: 48
Discharge: HOME | End: 2018-06-01
Attending: OBSTETRICS & GYNECOLOGY
Payer: MEDICARE

## 2018-06-01 DIAGNOSIS — Z12.31 VISIT FOR SCREENING MAMMOGRAM: ICD-10-CM

## 2018-06-01 PROCEDURE — 77067 SCR MAMMO BI INCL CAD: CPT

## 2018-06-04 ENCOUNTER — TELEPHONE (OUTPATIENT)
Dept: MAMMOGRAPHY | Facility: CLINIC | Age: 48
End: 2018-06-04

## 2018-06-04 NOTE — TELEPHONE ENCOUNTER
Pt called back and asked if I could get her appt on Tuesday or Wednesday morning and she might be able to get there, pt given appt on Wed morning at 0830, pt states she will be able to get ride, pt still has name/# for any other issues

## 2018-06-04 NOTE — TELEPHONE ENCOUNTER
Long discussion held with patient on reason for callback for diagnostic mammogram/ultrasound, pt states she does not drive and does not have anyone to bring her, pt states she is leaving on Sunday and will not be back until September, pt given options for appts but states she will not be able to come before she leaves, pt asked if it would be ok to come after she returns in September, adv that was her decision, pt took my name/# and states she will call once she returns, adv I would close this out as not going to complete at this time, pt agrees, pt with no further questions at this time

## 2018-06-06 ENCOUNTER — HOSPITAL ENCOUNTER (OUTPATIENT)
Dept: RADIOLOGY | Facility: HOSPITAL | Age: 48
Discharge: HOME | End: 2018-06-06
Attending: OBSTETRICS & GYNECOLOGY
Payer: MEDICARE

## 2018-06-06 ENCOUNTER — TELEPHONE (OUTPATIENT)
Dept: GASTROENTEROLOGY | Facility: CLINIC | Age: 48
End: 2018-06-06

## 2018-06-06 DIAGNOSIS — R92.8 ABNORMAL MAMMOGRAM: ICD-10-CM

## 2018-06-06 PROCEDURE — 77065 DX MAMMO INCL CAD UNI: CPT

## 2018-06-06 NOTE — TELEPHONE ENCOUNTER
Dr Keisha Loving pt    Pt  called in to advise that yovani lost her prep instructions sheet  Please email to [email or web address]  [email or web address]

## 2018-06-08 ENCOUNTER — ANESTHESIA (OUTPATIENT)
Dept: PERIOP | Facility: AMBULARY SURGERY CENTER | Age: 48
End: 2018-06-08
Payer: COMMERCIAL

## 2018-06-08 ENCOUNTER — HOSPITAL ENCOUNTER (OUTPATIENT)
Facility: AMBULARY SURGERY CENTER | Age: 48
Setting detail: OUTPATIENT SURGERY
Discharge: HOME | End: 2018-06-08
Attending: INTERNAL MEDICINE | Admitting: INTERNAL MEDICINE
Payer: COMMERCIAL

## 2018-06-08 VITALS
OXYGEN SATURATION: 100 % | SYSTOLIC BLOOD PRESSURE: 148 MMHG | RESPIRATION RATE: 18 BRPM | HEIGHT: 70 IN | TEMPERATURE: 97.5 F | BODY MASS INDEX: 22.33 KG/M2 | HEART RATE: 80 BPM | DIASTOLIC BLOOD PRESSURE: 88 MMHG | WEIGHT: 156 LBS

## 2018-06-08 PROCEDURE — 45378 DIAGNOSTIC COLONOSCOPY: CPT | Performed by: INTERNAL MEDICINE

## 2018-06-08 RX ORDER — SODIUM CHLORIDE 9 MG/ML
INJECTION, SOLUTION INTRAVENOUS CONTINUOUS PRN
Status: DISCONTINUED | OUTPATIENT
Start: 2018-06-08 | End: 2018-06-08 | Stop reason: SURG

## 2018-06-08 RX ORDER — SODIUM CHLORIDE 9 MG/ML
75 INJECTION, SOLUTION INTRAVENOUS CONTINUOUS
Status: CANCELLED | OUTPATIENT
Start: 2018-06-08

## 2018-06-08 RX ORDER — PROPOFOL 10 MG/ML
INJECTION, EMULSION INTRAVENOUS AS NEEDED
Status: DISCONTINUED | OUTPATIENT
Start: 2018-06-08 | End: 2018-06-08 | Stop reason: SURG

## 2018-06-08 RX ADMIN — PROPOFOL 20 MG: 10 INJECTION, EMULSION INTRAVENOUS at 09:10

## 2018-06-08 RX ADMIN — PROPOFOL 20 MG: 10 INJECTION, EMULSION INTRAVENOUS at 09:11

## 2018-06-08 RX ADMIN — PROPOFOL 100 MG: 10 INJECTION, EMULSION INTRAVENOUS at 09:09

## 2018-06-08 RX ADMIN — SODIUM CHLORIDE: 0.9 INJECTION, SOLUTION INTRAVENOUS at 09:05

## 2018-06-08 RX ADMIN — PROPOFOL 10 MG: 10 INJECTION, EMULSION INTRAVENOUS at 09:14

## 2018-06-08 NOTE — ANESTHESIA PREPROCEDURE EVALUATION
Review of Systems/Medical History  Patient summary reviewed  Chart reviewed  No history of anesthetic complications     Cardiovascular  Negative cardio ROS    Pulmonary  Negative pulmonary ROS        GI/Hepatic    Bowel prep         Comment: Neurogenic bladder     Endo/Other  Negative endo/other ROS      GYN  Negative gynecology ROS          Hematology  Negative hematology ROS      Musculoskeletal  Negative musculoskeletal ROS        Neurology    Neuromuscular disease , multiple sclerosis,    Psychology   Negative psychology ROS              Physical Exam    Airway    Mallampati score: II  TM Distance: >3 FB  Neck ROM: full     Dental   No notable dental hx     Cardiovascular  Comment: Negative ROS, Rhythm: regular, Rate: normal, Cardiovascular exam normal    Pulmonary  Pulmonary exam normal Breath sounds clear to auscultation,     Other Findings        Anesthesia Plan  ASA Score- 3     Anesthesia Type- IV sedation with anesthesia with ASA Monitors  Additional Monitors:   Airway Plan:         Plan Factors-    Induction- intravenous  Postoperative Plan-     Informed Consent- Anesthetic plan and risks discussed with patient  I personally reviewed this patient with the CRNA  Discussed and agreed on the Anesthesia Plan with the CRNA  Eric Grace Recent labs personally reviewed:  Lab Results   Component Value Date    WBC 6 98 04/23/2018    HGB 13 8 04/23/2018     04/23/2018     Lab Results   Component Value Date    BUN 17 09/21/2017    CREATININE 0 78 09/21/2017     I, Sumeet Bailey MD, have personally seen and evaluated the patient prior to anesthetic care  I have reviewed the pre-anesthetic record, and other medical records if appropriate to the anesthetic care  If a CRNA is involved in the case, I have reviewed the CRNA assessment, if present, and agree   Risks/benefits and alternatives discussed with patient including possible PONV, sore throat, and possibility of rare anesthetic and surgical emergencies

## 2018-06-08 NOTE — ANESTHESIA POSTPROCEDURE EVALUATION
Post-Op Assessment Note      CV Status:  Stable    Mental Status:  Alert and awake    Hydration Status:  Euvolemic    PONV Controlled:  Controlled    Airway Patency:  Patent    Post Op Vitals Reviewed: Yes          Staff: Anesthesiologist, CRNA           /71   Temp      Pulse  79   Resp   16   SpO2   100

## 2018-06-08 NOTE — OP NOTE
COLONOSCOPY    PROCEDURE: Colonoscopy    INDICATIONS: Constipation    POST-OP DIAGNOSIS: See the impression below    SEDATION: Monitored anesthesia care, check anesthesia records    PHYSICAL EXAM:    /59   Pulse 83   Temp 97 5 °F (36 4 °C) (Temporal)   Resp 18   Ht 5' 10" (1 778 m)   Wt 70 8 kg (156 lb)   LMP 02/08/2018   SpO2 100%   Breastfeeding? No Comment: Unable to provide urine sample  Dr Margarita Coto aware  BMI 22 38 kg/m²   Body mass index is 22 38 kg/m²  General: NAD  Heart: S1 & S2 normal, RRR  Lungs: CTA, No rales or rhonchi  Abdomen: Soft, nontender, nondistended, good bowel sounds    CONSENT:  Informed consent was obtained for the procedure, including sedation after explaining the risks and benefits of the procedure  Risks including but not limited to bleeding, perforation, infection, aspiration were discussed in detail  Also explained about less than 100%$ sensitivity with the exam and other alternatives  PREPARATION:   EKG tracing, pulse oximetry, blood pressure were monitored throughout the procedure  Patient was identified by myself both verbally and by visual inspection of ID band  DESCRIPTION:   Patient was placed in the left lateral decubitus position and was sedated with the above medication  Digital rectal examination was performed  The colonoscope was introduced in to the anal canal and advanced up to cecum, which was identified by the appendiceal orifice and IC valve  A careful inspection was made as the colonoscope was withdrawn, including a retroflexed view of the rectum; findings and interventions are described below  Appropriate photodocumentation was obtained  The quality of the colonic preparation was adequate  FINDINGS:    1  Cecum and ileocecal valve-normal mucosa    2    The rest of the colon mucosa is normal         IMPRESSIONS:      As above    RECOMMENDATIONS:    Continue Amitiza    COMPLICATIONS:  None; patient tolerated the procedure well     DISPOSITION: PACU           CONDITION: Stable

## 2018-06-08 NOTE — H&P
History and Physical - [facility]  Nora Chapman 49 y o  female MRN: [id number]        HPI: 69-year-old female with history of multiple sclerosis reports having problems with constipation for couple of years  She was told her problems are because of MS  She strains hard and sometimes pushes through her vagina  She was taking Dulcolax with some help before  Good appetite, no recent weight loss  She is complaining about bloating sometimes  Denies any heartburn acid reflux  Denies any difficulty swallowing  Historical Information   Past Medical History:   Diagnosis Date    Multiple sclerosis ([facility] [address] )      Past Surgical History:   Procedure Laterality Date    WISDOM TOOTH EXTRACTION       Social History   History   Alcohol Use    Yes     Comment: occassionally     History   Drug Use No     History   Smoking Status    Never Smoker   Smokeless Tobacco    Never Used     Family History   Problem Relation Age of Onset    Addiction problem Father      alcohol    Liver disease Father        Meds/Allergies     Prescriptions Prior to Admission   Medication    bisacodyl (DULCOLAX) 5 mg EC tablet    Cholecalciferol (VITAMIN D PO)    Cyanocobalamin (VITAMIN B-12 PO)    levonorgestrel-ethinyl estradiol (RAMIN-28) 0 15-30 MG-MCG per tablet    lubiprostone (AMITIZA) 24 mcg capsule    nitrofurantoin (MACROBID) 100 mg capsule    Omega-3 Fatty Acids (FISH OIL) 1200 MG CAPS    polyethylene glycol (GOLYTELY) 4000 mL solution    REBIF 44 MCG/0 5ML SOSY       Allergies   Allergen Reactions    Clindamycin     Penicillins        Objective     There were no vitals taken for this visit      PHYSICAL EXAM:    Gen: NAD  CV: S1 & S2 normal, RRR  CHEST: Clear to auscultate  ABD: soft, NT/ND, good bowel sounds  EXT: no edema    ASSESSMENT:     Constipation    PLAN:    Colonoscopy

## 2018-09-20 ENCOUNTER — IMMUNIZATION (OUTPATIENT)
Dept: FAMILY MEDICINE CLINIC | Facility: CLINIC | Age: 48
End: 2018-09-20
Payer: MEDICARE

## 2018-09-20 DIAGNOSIS — Z23 ENCOUNTER FOR IMMUNIZATION: ICD-10-CM

## 2018-09-20 PROCEDURE — 90686 IIV4 VACC NO PRSV 0.5 ML IM: CPT

## 2018-09-20 PROCEDURE — G0008 ADMIN INFLUENZA VIRUS VAC: HCPCS

## 2018-09-21 ENCOUNTER — TELEPHONE (OUTPATIENT)
Dept: NEUROLOGY | Facility: CLINIC | Age: 48
End: 2018-09-21

## 2018-09-21 ENCOUNTER — APPOINTMENT (OUTPATIENT)
Dept: LAB | Facility: CLINIC | Age: 48
End: 2018-09-21
Payer: COMMERCIAL

## 2018-09-21 DIAGNOSIS — G31.9 DEGENERATIVE DISEASE OF NERVOUS SYSTEM (HCC): ICD-10-CM

## 2018-09-21 DIAGNOSIS — G35 MULTIPLE SCLEROSIS (HCC): Primary | ICD-10-CM

## 2018-09-21 LAB
ALBUMIN SERPL BCP-MCNC: 3.5 G/DL (ref 3.5–5)
ALP SERPL-CCNC: 66 U/L (ref 46–116)
ALT SERPL W P-5'-P-CCNC: 31 U/L (ref 12–78)
AST SERPL W P-5'-P-CCNC: 22 U/L (ref 5–45)
BASOPHILS # BLD AUTO: 0.04 THOUSANDS/ΜL (ref 0–0.1)
BASOPHILS NFR BLD AUTO: 1 % (ref 0–1)
BILIRUB DIRECT SERPL-MCNC: 0.08 MG/DL (ref 0–0.2)
BILIRUB SERPL-MCNC: 0.37 MG/DL (ref 0.2–1)
EOSINOPHIL # BLD AUTO: 0.05 THOUSAND/ΜL (ref 0–0.61)
EOSINOPHIL NFR BLD AUTO: 1 % (ref 0–6)
ERYTHROCYTE [DISTWIDTH] IN BLOOD BY AUTOMATED COUNT: 13.2 % (ref 11.6–15.1)
HCT VFR BLD AUTO: 43.6 % (ref 34.8–46.1)
HGB BLD-MCNC: 13.8 G/DL (ref 11.5–15.4)
IMM GRANULOCYTES # BLD AUTO: 0.02 THOUSAND/UL (ref 0–0.2)
IMM GRANULOCYTES NFR BLD AUTO: 0 % (ref 0–2)
LYMPHOCYTES # BLD AUTO: 2.27 THOUSANDS/ΜL (ref 0.6–4.47)
LYMPHOCYTES NFR BLD AUTO: 35 % (ref 14–44)
MCH RBC QN AUTO: 30.7 PG (ref 26.8–34.3)
MCHC RBC AUTO-ENTMCNC: 31.7 G/DL (ref 31.4–37.4)
MCV RBC AUTO: 97 FL (ref 82–98)
MONOCYTES # BLD AUTO: 0.6 THOUSAND/ΜL (ref 0.17–1.22)
MONOCYTES NFR BLD AUTO: 9 % (ref 4–12)
NEUTROPHILS # BLD AUTO: 3.47 THOUSANDS/ΜL (ref 1.85–7.62)
NEUTS SEG NFR BLD AUTO: 54 % (ref 43–75)
NRBC BLD AUTO-RTO: 0 /100 WBCS
PLATELET # BLD AUTO: 318 THOUSANDS/UL (ref 149–390)
PMV BLD AUTO: 10.8 FL (ref 8.9–12.7)
PROT SERPL-MCNC: 7.7 G/DL (ref 6.4–8.2)
RBC # BLD AUTO: 4.5 MILLION/UL (ref 3.81–5.12)
WBC # BLD AUTO: 6.45 THOUSAND/UL (ref 4.31–10.16)

## 2018-09-21 PROCEDURE — 85025 COMPLETE CBC W/AUTO DIFF WBC: CPT

## 2018-09-21 PROCEDURE — 80076 HEPATIC FUNCTION PANEL: CPT

## 2018-09-21 PROCEDURE — 36415 COLL VENOUS BLD VENIPUNCTURE: CPT

## 2018-09-21 NOTE — TELEPHONE ENCOUNTER
Called and advised pt of the below  She verbalized clear understanding  She will get it done at [address] [place] [address]

## 2018-09-21 NOTE — TELEPHONE ENCOUNTER
Pt's  Rain De Oliveira called asking if repeat blood work needed prior to pt's next appt?  Follow up appt scheduled 10/18/18 @ 10:15 am        [phone]

## 2018-10-03 ENCOUNTER — OFFICE VISIT (OUTPATIENT)
Dept: NEUROLOGY | Facility: CLINIC | Age: 48
End: 2018-10-03

## 2018-10-03 DIAGNOSIS — G35 MULTIPLE SCLEROSIS (HCC): Primary | ICD-10-CM

## 2018-10-03 DIAGNOSIS — R41.89 COGNITIVE DECLINE: ICD-10-CM

## 2018-10-03 NOTE — LETTER
October 17, 2018     Tri Aguilar PA-C  [address]  [facility]    Patient: Jessie Lopez   YOB: 1970   Date of Visit: 10/3/2018       Dear Dr Wilkerson Postal:    Thank you for referring Rolando Ricardo to me for evaluation  Below are my notes for this consultation  If you have questions, please do not hesitate to call me  Sincerely,        Caroline Byrd PSYD        CC: MD Caroline Cole PSYD  10/17/2018 10:19 AM  Sign at close encounter  Julio Smith    Name:    Rolando Ricardo  YOB: 1970   Age:    51   Date of Service:  10/3/2018   Referred By:   Susan Ledbetter MD          REASON FOR REFERRAL  Rolando Ricardo is a 51 y o  right-handed  female referred by Dr Eli Lopez for a neuropsychological evaluation of cognitive difficulties in the context of multiple sclerosis  Patient underwent previous evaluation in 2005 with Dr Sue Ormond  CHIEF COMPLAINT  Patient reported that her memory is terrible   Her  explained that it goes and comes   They highlighted memory difficulty with functional tasks such as management of medication, finances, and schedule  Her  explained that after completing one of these tasks she is shot after   Ms  Jeffry has Rebif injections on Mondays, Wednesdays, and Fridays  Her  tries to remind her  He counts up the injections and has found that she is missing approximately three injections per month  Regarding finances, patient is responsible for paying her cell phone and credit card bills, which she does by check  She acknowledged some difficulty balancing her checkbook though stated that it is getting better; she has recorded incorrect check numbers and the process fries [her] brain    noted that she curses while paying the bills, needs complete quiet, and may rip up erroneous checks  Patient was observed to put her second appointment with me in her smartphone    noted that they review their schedules together every two weeks  Patient and  denied current language difficulties  She had difficulty with word-finding after an MS seizure around 2010  Patient and  endorsed difficulty with concentration and described her as frequently distracted   She has left the stove on on several occasions  They attempt to compensate for this difficulty by setting timers all over the place      Patient does not currently drive  She was reportedly instructed by Dr Arden Teresa not to drive about 4 years ago, though maintains a s license  She denied car accidents or traffic violations   stated that she has a directional problem   They park in the same area and go in the same entrance at the shopping center  Patient and  shop together, with lists they both create  She has left food at the store and in the car in the past   does the majority of the cooking  Patient will occasionally make errors with cooking due to difficulty following directions  She needs to read instructions ten times   Patient denied use of a computer at home; facebook was trialed but was too much   She operates a television without difficulty  She recently obtained a smartphone and uses it for alarms/timers      PAST MEDICAL HISTORY  Past Medical History:   Diagnosis Date    Multiple sclerosis ([facility] [address] )     Neurogenic bladder        CURRENT MEDICATIONS    Current Outpatient Prescriptions:     bisacodyl (DULCOLAX) 5 mg EC tablet, Take 2 tablets (10 mg total) by mouth once for 1 dose, Disp: 2 tablet, Rfl: 0    Cholecalciferol (VITAMIN D PO), Take by mouth, Disp: , Rfl:     Cyanocobalamin (VITAMIN B-12 PO), Take by mouth, Disp: , Rfl:     levonorgestrel-ethinyl estradiol (RAMIN-28) 0 15-30 MG-MCG per tablet, Take by mouth, Disp: , Rfl:     lubiprostone (AMITIZA) 24 mcg capsule, Take 1 capsule (24 mcg total) by mouth 2 (two) times a day with meals, Disp: 60 capsule, Rfl: 6   nitrofurantoin (MACROBID) 100 mg capsule, Take by mouth, Disp: , Rfl:     Omega-3 Fatty Acids (FISH OIL) 1200 MG CAPS, Take by mouth, Disp: , Rfl:     REBIF 44 MCG/0 5ML SOSY, INJECT 44 MCG (0 5 ML) UNDER THE SKIN THREE TIMES A WEEK AS DIRECTED, Disp: 6 mL, Rfl: 9    NEUROIMAGING PER CHART  Per chart, MRI 10/6/17: Multiple supratentorial white matter foci consistent with chronic MS, unchanged in distribution  No pathologic enhancement or diffusion hyperintensity  Global atrophy, greater than expected for patient's stated age    PSYCHOSOCIAL HISTORY  Developmental/Educational history:  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)  Social history:  Patient is  to her second  of 15 years  She is  from her first  of five years  She has no children  Patient lives with , four cats, and two dogs  Patients mother  of homicide  Father  in his 76s from liver disease (alcoholic)  Patient has a sister 10 years her senior who she described as psycho with major control issues   She has a brother 8 years her senior with whom she has a better relationship  Family history of dementia was denied  She reported weird shit (undiagnosed mental health issues?) in her mothers family   Patients primary social support is her  and some neighbors  She is close with her father-in-law (mother-in-law  in April)  She stated that worrying about [place] is her primary current stressor  She spends her days cleaning and watching television  During the summer, she enjoys fishing  She reported previously being an avid reader, but difficulty with concentration and memory interfere with this hobby  Psychiatric history:  Patient denied a formal mental health history  An antidepressant was reportedly prescribed by a previous neurologist; she took it once and discontinued due to feeling overmedicated  She described her current mood as alright   She denied current symptoms of major depression, anxiety, frank, or psychosis   described some irritability that is quick to come and then goes away   Patient denied current SI or HI  She experienced SI on the above-mentioned antidepressant  There are multiple firearms in the home  Patient reported that she sleeps a lot, from 10-11 PM until 8-8:15 AM without difficulty with onset or maintenance  About once a month her brain will turn on and she will feel wired and unable to maintain sleep  She takes a 30 minute nap each afternoon  She denied recent changes in appetite  Alcohol/Drug use history:  Patient reported infrequent alcohol consumption (one drink/month)  She denied a history of excessive use  Family history is significant for alcohol abuse in father  Patient denied use of tobacco or illicit substances  BEHAVIORAL OBSERVATIONS   Ms Teran presented with her , Sae Sommer  She appeared appropriately groomed and dressed  She was pleasant throughout the interview and testing, though was somewhat disinhibited and overly familiar (cursing, calling undersigned girlfriend)  She was alert and fully oriented  Speech was fluent; language expression was effective  Thought process was goal-directed  Affect was full range and appropriate to session content  TESTS ADMINISTERED  Wechsler Adult Intelligence Scale (WAIS-IV; selected subtests)  Brown Neuropsychological Battery: Forced Choice Test; Vincent Complex Figure Test; Animal Naming; One-Minute Time Estimation; Controlled Oral Word Association Test; Dichotic Listening; Sentence Repetition Test; Vincent Auditory Verbal Learning Test; Judgment of Line Orientation; Garett & Garett; Finger Tapping Test; Finger Localization; Trail Making Test A and B; Token Test; Busby Category Test [facility] Version  Test of Premorbid Functioning  Stroop Color and Word Test  Patient Competency Rating Form  Dowd Depression Inventory (BDI-II)  Symptom Checklist (SCL-90-R)    NEUROPSYCHOLOGICAL FINDINGS  Ms Teran was administered a battery of neuropsychological measures (listed above) which assessed her abilities, compared to individuals matched for age, education, gender, ethnicity, and handedness, as appropriate  The patients performance on multiple validity procedures indicates that the cognitive data obtained during this assessment is likely valid for interpretation  PREMORBID INTELLECTUAL FUNCTIONING  The patients premorbid intellectual functioning is estimated to be in the low average to average range  Performance on subsequent measures will be considered relative to this premorbid estimate  Overall cognitive performance on this evaluation was mildly deficient, and not statistically significantly below her expected performance level  PROCESSING SPEED  Overall, cognitive speed and efficiency of information processing is in the upper end of the low average range, with performance on all tasks within her expected performance level  Graphic symbol-digit substitution was in the upper end of the low average range  Timed visual scanning and simple numerical sequencing was in the low average range  On the Stroop, word reading and color naming speed were both in the average range      ATTENTION AND WORKING MEMORY  Overall, attention and working memory is mildly deficient, and not statistically significantly below her expected performance level  Auditory attention for numeric sequences was in the low average range; within the digit span task, performance on a basic auditory trace attention task (digit span forward) was in the average range, while she performed in the deficient to low average range on more complex working [place] tasks (digit span backward and sequencing, respectively)  Her ability to keep information in mind when performing arithmetic problems was in the low average range  The patients capacity to immediately recite words from a random auditory list was deficient  Attention for contextual auditory-verbal information was in the low average range  Sustained auditory attention, assessed by a task requiring the patient to attend to and process multiple auditory stimuli simultaneously, was not valid  LANGUAGE  Overall, verbal expression and conceptualization is in the low average range, and within her expected performance level  Basic comprehension and execution of simple instructions was in the average range  Expression of general factual knowledge was in the low average range  Deductive reasoning skills were in the low average range  Confrontational naming was in the low average range  Semantic fluency by category was mildly deficient, while verbal fluency by starting letter was in the average range  VISUOSPATIAL AND CONSTRUCTIONAL FUNCTIONING   Overall, perceptual organizational abilities are in the low average range, and within her expected performance level  Visual spatial conceptual organization when manipulating blocks to form a copy of a modeled design was in the low average range  Her perceptual attuneness to fine visual detail of familiar stimuli was in the average range  Visual constructional skills were in the average range   Visuospatial judgment and estimation based on line angulation between 0 and 180 degrees was moderately deficient  EXECUTIVE FUNCTIONING  Overall, reasoning and problem-solving skills are in the low average range, and within her expected performance level  Mental flexibility as measured with alphanumeric set switching was moderately deficient as measured by time to completion; additionally, she committed several errors  Visual analytic reasoning and novel problem-solving was in the low average range  Verbal abstract conceptual reasoning was in the low average range  Verbal fluency by starting letter was in the average range  Selective attention assessed with the Stroop was in the average to high average range  MEMORY  VERBAL MEMORY  Overall, the patients ability for learning, encoding, and recalling auditory verbal information is impaired, and significantly below her expected performance level  For a 15-word list, the amount of information absorbed from single exposure was moderately deficient (4)  Learning over five trials was impaired (4, 3, 4, 4, 3)  After repeated exposures, the amount of information retained was profoundly impaired  Her recall of the original word list after distraction with a second word list was impaired (2), with impaired retention  After a 30-minute delay, recall was impaired (1), with impaired retention  Recognition of the words was average (13), though with impaired discriminability (14 false positives)  VISUAL MEMORY  Overall, the patients ability for learning, encoding, and recalling visual-spatial information is in the low average range, and within her expected performance level  Short-term recall of a complex figure (after distraction) was in the low average range  Delayed recall (after 30 minutes) was in the average range  Delayed recognition of components of the design was mildly deficient      SENSORY-MOTOR  Fine motor speed was in the upper end of the low average range for the dominant hand, but moderately deficient for the non-dominant hand  Tactile perception was entirely accurate and in the high average range for the dominant hand, and in the average range for the non-dominant hand  FUNCTIONAL QUESTIONNAIRE  On the Patient Competency Rating Scale, the patients self-rating of her overall independent functioning was in the impaired range  Social behavior was rated in the average range; Social interaction was rated as mildly deficient; Emotional control was rated as moderately deficient; Functional cognition and daily living activities were rated as impaired  Her  completed the questionnaire as well, and his rating of her overall independent functioning was in the moderately deficient range  All scales were rated in the low average range, with the exception of functional cognition which was rated as impaired  He emphasized difficulty with meal preparation, financial management, keeping appointments on time, staying involved in work activities, remembering last nights dinner, remembering daily schedule, remembering important things that must be done, driving, understanding new instructions, and thinking things through before doing them  EMOTIONAL/PERSONALITY FUNCTIONING  On the BDI-II, she scored 10, indicating mild depressive symptomatology  She endorsed mild pessimism, self-dislike, self-criticalness, agitation, indecisiveness, loss of energy, increased sleep, concentration difficulty, fatigue, and loss of interest in sex  On a self-report questionnaire of emotional functioning over the past week (SCL-90-R), she endorsed significant cognitive concerns (e g , memory, needing to do things slowly to insure correctness) and certain symptoms of psychosis (e g , believing someone controls her thoughts, and others being aware of her private thoughts)  She also reported certain symptoms of depression including marked loss of sexual interest/pleasure, low energy, and self-blame      SUMMARY and IMPRESSIONS  Performance on this comprehensive neuropsychological evaluation revealed disparate cognitive abilities across domains assessed  Verbal memory is a significant area of impairment for Ms Teran  She demonstrated difficulty learning new verbal information, even with multiple repetitions, and poor recall/retention of such information in the short-term and long-term  Ms  Kassie Ford demonstrated mild deficiencies in aspects of attention relative to her cohort  Overall, cognitive speed, language, visual reasoning, visual memory, and executive functioning are in the low average range relative to demographically similar peers, and within her expected performance level relative to estimated low average to average premorbid intellectual functioning  Within these areas, circumscribed weaknesses were revealed in visuospatial judgment and estimation based on line angulation, and mental flexibility as measured with alphanumeric set switching  Direct comparison of current results to 2005 evaluation is made difficult by variations in measures used  Overall, her areas of weakness at that time included attention/concentration, information processing, and memory  At the current time, significantly decreased memory abilities (verbal memory, in particular) and deficiencies in aspects of attention/concentration are appreciated, and current performance may represent a decline from prior testing  On a task level, a decline is appreciated in visuospatial judgment and estimation based on line angulation (consistent with neuro-ophthalmologic issues reported)  The above cognitive deficits appear to substantially impact Ms Triana day-to-day functioning, as reported   She and her  have implemented numerous compensatory strategies which seem beneficial  Given the demonstrated impairment in learning and memory for verbal information, I strongly encourage them to capitalize upon more intact visual memory skills by using visual reminders such as written lists and notes  They already utilize frequent verbal reminders, visual reminders, and alarm clocks/timers for memory assistance  A referral to ST/OT may be beneficial in assisting with additional compensatory strategies given cognitive difficulties described above  Also, I have encouraged Ms Teran to discuss medication options for memory/cognition with Dr Tripp Villarreal  Psychological measures included in this evaluation reveal mild depressive symptomatology and endorsement of certain psychotic symptoms, though patient denied psychosis in consultation and feedback sessions  Thank you for this referral  This evaluation serves to establish Ms Triana new baseline  Given appreciated areas of difficulty, particularly memory, which may represent a decline from previous evaluation in , please continually monitor Ms Triana cognitive functioning and re-refer in the future if you, patient, or family appreciate further declines in additional cognitive areas or functional abilities  Please feel free to contact me with any questions  Bibiana Rosario PsyD  Clinical Neuropsychologist      Total time for chart review, interview, test administration and scorin hours  Total time for report preparation and patient/family feedback: 3 hours

## 2018-10-15 ENCOUNTER — TELEPHONE (OUTPATIENT)
Dept: NEUROLOGY | Facility: CLINIC | Age: 48
End: 2018-10-15

## 2018-10-17 ENCOUNTER — TELEPHONE (OUTPATIENT)
Dept: NEUROLOGY | Facility: CLINIC | Age: 48
End: 2018-10-17

## 2018-10-17 ENCOUNTER — OFFICE VISIT (OUTPATIENT)
Dept: NEUROLOGY | Facility: CLINIC | Age: 48
End: 2018-10-17
Payer: COMMERCIAL

## 2018-10-17 DIAGNOSIS — G35 MULTIPLE SCLEROSIS (HCC): Primary | ICD-10-CM

## 2018-10-17 DIAGNOSIS — R41.89 COGNITIVE DECLINE: ICD-10-CM

## 2018-10-17 PROCEDURE — 96118 PR NEUROPSYCH TESTING BY PSYCH/PHYS: CPT | Performed by: CLINICAL NEUROPSYCHOLOGIST

## 2018-10-17 NOTE — PROGRESS NOTES
Feedback following neuropsychological evaluation:  Patient and  were seen for feedback from neuropsychological evaluation completed on 10/3/18  Discussed her current cognitive profile with significant impairments in memory with reported impact on daily functioning  Psychological measures included in the evaluation were indicative of mild depressive symptoms  Discussed recommendations including compensatory strategies, consideration of ST/OT for the same, and discussion with referring providers as to medication for cognition  Report will be routed to Dr Faiza Pablo and Rosanne Steiner PA-C  She is scheduled for neurological follow-up with Ms  Leoncio Arreola on 10/18/18  Bibiana Gonzales PsyD  Clinical Neuropsychologist    Total time for chart review, interview, test administration and scorin hours  Total time for report preparation and patient/family feedback: 3 hours

## 2018-10-17 NOTE — PROGRESS NOTES
NEUROPSYCHOLOGY EVALUATION    Name:    Keely Gifford  YOB: 1970   Age:    51   Date of Service:  10/3/2018   Referred By:   Jillene Schlatter, MD          REASON FOR REFERRAL  Keely Gifford is a 51 y o  right-handed  female referred by Dr Flores Laura for a neuropsychological evaluation of cognitive difficulties in the context of multiple sclerosis  Patient underwent previous evaluation in 2005 with Dr Doron Clifford  CHIEF COMPLAINT  Patient reported that her memory is terrible   Her  explained that it goes and comes   They highlighted memory difficulty with functional tasks such as management of medication, finances, and schedule  Her  explained that after completing one of these tasks she is shot after   Ms  Jeffry has Rebif injections on Mondays, Wednesdays, and Fridays  Her  tries to remind her  He counts up the injections and has found that she is missing approximately three injections per month  Regarding finances, patient is responsible for paying her cell phone and credit card bills, which she does by check  She acknowledged some difficulty balancing her checkbook though stated that it is getting better; she has recorded incorrect check numbers and the process fries [her] brain    noted that she curses while paying the bills, needs complete quiet, and may rip up erroneous checks  Patient was observed to put her second appointment with me in her smartphone   noted that they review their schedules together every two weeks  Patient and  denied current language difficulties  She had difficulty with word-finding after an MS seizure around 2010  Patient and  endorsed difficulty with concentration and described her as frequently distracted   She has left the stove on on several occasions  They attempt to compensate for this difficulty by setting timers all over the place      Patient does not currently drive   She was reportedly instructed by Dr Flores Laura not to drive about 4 years ago, though maintains a s license  She denied car accidents or traffic violations   stated that she has a directional problem   They park in the same area and go in the same entrance at the shopping center  Patient and  shop together, with lists they both create  She has left food at the store and in the car in the past   does the majority of the cooking  Patient will occasionally make errors with cooking due to difficulty following directions  She needs to read instructions ten times   Patient denied use of a computer at home; facebook was trialed but was too much   She operates a television without difficulty  She recently obtained a smartphone and uses it for alarms/timers  PAST MEDICAL HISTORY  Past Medical History:   Diagnosis Date    Multiple sclerosis ([facility] [address] )     Neurogenic bladder        CURRENT MEDICATIONS    Current Outpatient Prescriptions:     bisacodyl (DULCOLAX) 5 mg EC tablet, Take 2 tablets (10 mg total) by mouth once for 1 dose, Disp: 2 tablet, Rfl: 0    Cholecalciferol (VITAMIN D PO), Take by mouth, Disp: , Rfl:     Cyanocobalamin (VITAMIN B-12 PO), Take by mouth, Disp: , Rfl:     levonorgestrel-ethinyl estradiol (RAMIN-28) 0 15-30 MG-MCG per tablet, Take by mouth, Disp: , Rfl:     lubiprostone (AMITIZA) 24 mcg capsule, Take 1 capsule (24 mcg total) by mouth 2 (two) times a day with meals, Disp: 60 capsule, Rfl: 6    nitrofurantoin (MACROBID) 100 mg capsule, Take by mouth, Disp: , Rfl:     Omega-3 Fatty Acids (FISH OIL) 1200 MG CAPS, Take by mouth, Disp: , Rfl:     REBIF 44 MCG/0 5ML SOSY, INJECT 44 MCG (0 5 ML) UNDER THE SKIN THREE TIMES A WEEK AS DIRECTED, Disp: 6 mL, Rfl: 9    NEUROIMAGING PER CHART  Per chart, MRI 10/6/17: Multiple supratentorial white matter foci consistent with chronic MS, unchanged in distribution  No pathologic enhancement or diffusion hyperintensity   Global atrophy, greater than expected for patient's stated age    PSYCHOSOCIAL HISTORY  Developmental/Educational history:  Ms Melanie Allan 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  Developmental milestones were reportedly met within normal limits  She was raised in [place], [place]  Mother  of homicide when patient was [de-identified] years old  Patient was raised by father and paternal grandmother  She repeated  or first grade, attributed to social stressors, but denied a childhood history of learning difficulties  She graduated from high school  Occupational history:  Patient has been on disability for 8 years  She previously worked at [employer] for four years as an   She described difficulty with memory and multitasking (e g , 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)  Social history:  Patient is  to her second  of 15 years  She is  from her first  of five years  She has no children  Patient lives with , four cats, and two dogs  Patients mother  of homicide  Father  in his 76s from liver disease (alcoholic)  Patient has a sister 10 years her senior who she described as psycho with major control issues   She has a brother 8 years her senior with whom she has a better relationship  Family history of dementia was denied  She reported weird shit (undiagnosed mental health issues?) in her mothers family  Patients primary social support is her  and some neighbors  She is close with her father-in-law (mother-in-law  in April)  She stated that worrying about [place] is her primary current stressor  She spends her days cleaning and watching television  During the summer, she enjoys fishing  She reported previously being an avid reader, but difficulty with concentration and memory interfere with this hobby  Psychiatric history:  Patient denied a formal mental health history   An antidepressant was reportedly prescribed by a previous neurologist; she took it once and discontinued due to feeling overmedicated  She described her current mood as alright   She denied current symptoms of major depression, anxiety, frank, or psychosis   described some irritability that is quick to come and then goes away   Patient denied current SI or HI  She experienced SI on the above-mentioned antidepressant  There are multiple firearms in the home  Patient reported that she sleeps a lot, from 10-11 PM until 8-8:15 AM without difficulty with onset or maintenance  About once a month her brain will turn on and she will feel wired and unable to maintain sleep  She takes a 30 minute nap each afternoon  She denied recent changes in appetite  Alcohol/Drug use history:  Patient reported infrequent alcohol consumption (one drink/month)  She denied a history of excessive use  Family history is significant for alcohol abuse in father  Patient denied use of tobacco or illicit substances  BEHAVIORAL OBSERVATIONS   Ms Teran presented with her , Shmuel Sims  She appeared appropriately groomed and dressed  She was pleasant throughout the interview and testing, though was somewhat disinhibited and overly familiar (cursing, calling undersigned girlfriend)  She was alert and fully oriented  Speech was fluent; language expression was effective  Thought process was goal-directed  Affect was full range and appropriate to session content  TESTS ADMINISTERED  Wechsler Adult Intelligence Scale (WAIS-IV; selected subtests)  [place] Neuropsychological Battery: Forced Choice Test; Vincent Complex Figure Test; Animal Naming; One-Minute Time Estimation; Controlled Oral Word Association Test; Dichotic Listening; Sentence Repetition Test; Vincent Auditory Verbal Learning Test; Judgment of Line Orientation; Garett & Garett; Finger Tapping Test; Finger Localization; Trail Making Test A and B;  Token Test; [place] Category Test - [place] Version  Test of Premorbid Functioning  Stroop Color and Word Test  Patient Competency Rating Form  Dowd Depression Inventory (BDI-II)  Symptom Checklist (SCL-90-R)    NEUROPSYCHOLOGICAL FINDINGS  Ms Teran was administered a battery of neuropsychological measures (listed above) which assessed her abilities, compared to individuals matched for age, education, gender, ethnicity, and handedness, as appropriate  The patients performance on multiple validity procedures indicates that the cognitive data obtained during this assessment is likely valid for interpretation  PREMORBID INTELLECTUAL FUNCTIONING  The patients premorbid intellectual functioning is estimated to be in the low average to average range  Performance on subsequent measures will be considered relative to this premorbid estimate  Overall cognitive performance on this evaluation was mildly deficient, and not statistically significantly below her expected performance level  PROCESSING SPEED  Overall, cognitive speed and efficiency of information processing is in the upper end of the low average range, with performance on all tasks within her expected performance level  Graphic symbol-digit substitution was in the upper end of the low average range  Timed visual scanning and simple numerical sequencing was in the low average range  On the Stroop, word reading and color naming speed were both in the average range  ATTENTION AND WORKING MEMORY  Overall, attention and working memory is mildly deficient, and not statistically significantly below her expected performance level  Auditory attention for numeric sequences was in the low average range; within the digit span task, performance on a basic auditory trace attention task (digit span forward) was in the average range, while she performed in the deficient to low average range on more complex working [place] tasks (digit span backward and sequencing, respectively)   Her ability to keep information in mind when performing arithmetic problems was in the low average range  The patients capacity to immediately recite words from a random auditory list was deficient  Attention for contextual auditory-verbal information was in the low average range  Sustained auditory attention, assessed by a task requiring the patient to attend to and process multiple auditory stimuli simultaneously, was not valid  LANGUAGE  Overall, verbal expression and conceptualization is in the low average range, and within her expected performance level  Basic comprehension and execution of simple instructions was in the average range  Expression of general factual knowledge was in the low average range  Deductive reasoning skills were in the low average range  Confrontational naming was in the low average range  Semantic fluency by category was mildly deficient, while verbal fluency by starting letter was in the average range  VISUOSPATIAL AND CONSTRUCTIONAL FUNCTIONING   Overall, perceptual organizational abilities are in the low average range, and within her expected performance level  Visual spatial conceptual organization when manipulating blocks to form a copy of a modeled design was in the low average range  Her perceptual attuneness to fine visual detail of familiar stimuli was in the average range  Visual constructional skills were in the average range  Visuospatial judgment and estimation based on line angulation between 0 and 180 degrees was moderately deficient  EXECUTIVE FUNCTIONING  Overall, reasoning and problem-solving skills are in the low average range, and within her expected performance level  Mental flexibility as measured with alphanumeric set switching was moderately deficient as measured by time to completion; additionally, she committed several errors  Visual analytic reasoning and novel problem-solving was in the low average range  Verbal abstract conceptual reasoning was in the low average range   Verbal fluency by starting letter was in the average range  Selective attention assessed with the Stroop was in the average to high average range  MEMORY  VERBAL MEMORY  Overall, the patients ability for learning, encoding, and recalling auditory verbal information is impaired, and significantly below her expected performance level  For a 15-word list, the amount of information absorbed from single exposure was moderately deficient (4)  Learning over five trials was impaired (4, 3, 4, 4, 3)  After repeated exposures, the amount of information retained was profoundly impaired  Her recall of the original word list after distraction with a second word list was impaired (2), with impaired retention  After a 30-minute delay, recall was impaired (1), with impaired retention  Recognition of the words was average (13), though with impaired discriminability (14 false positives)  VISUAL MEMORY  Overall, the patients ability for learning, encoding, and recalling visual-spatial information is in the low average range, and within her expected performance level  Short-term recall of a complex figure (after distraction) was in the low average range  Delayed recall (after 30 minutes) was in the average range  Delayed recognition of components of the design was mildly deficient  SENSORY-MOTOR  Fine motor speed was in the upper end of the low average range for the dominant hand, but moderately deficient for the non-dominant hand  Tactile perception was entirely accurate and in the high average range for the dominant hand, and in the average range for the non-dominant hand  FUNCTIONAL QUESTIONNAIRE  On the Patient Competency Rating Scale, the patients self-rating of her overall independent functioning was in the impaired range  Social behavior was rated in the average range; Social interaction was rated as mildly deficient; Emotional control was rated as moderately deficient;  Functional cognition and daily living activities were rated as impaired  Her  completed the questionnaire as well, and his rating of her overall independent functioning was in the moderately deficient range  All scales were rated in the low average range, with the exception of functional cognition which was rated as impaired  He emphasized difficulty with meal preparation, financial management, keeping appointments on time, staying involved in work activities, remembering last nights dinner, remembering daily schedule, remembering important things that must be done, driving, understanding new instructions, and thinking things through before doing them  EMOTIONAL/PERSONALITY FUNCTIONING  On the BDI-II, she scored 10, indicating mild depressive symptomatology  She endorsed mild pessimism, self-dislike, self-criticalness, agitation, indecisiveness, loss of energy, increased sleep, concentration difficulty, fatigue, and loss of interest in sex  On a self-report questionnaire of emotional functioning over the past week (SCL-90-R), she endorsed significant cognitive concerns (e g , memory, needing to do things slowly to insure correctness) and certain symptoms of psychosis (e g , believing someone controls her thoughts, and others being aware of her private thoughts)  She also reported certain symptoms of depression including marked loss of sexual interest/pleasure, low energy, and self-blame  SUMMARY and IMPRESSIONS  Performance on this comprehensive neuropsychological evaluation revealed disparate cognitive abilities across domains assessed  Verbal memory is a significant area of impairment for Ms  Jeffry  She demonstrated difficulty learning new verbal information, even with multiple repetitions, and poor recall/retention of such information in the short-term and long-term  Ms  Edith May demonstrated mild deficiencies in aspects of attention relative to her cohort   Overall, cognitive speed, language, visual reasoning, visual memory, and executive functioning are in the low average range relative to demographically similar peers, and within her expected performance level relative to estimated low average to average premorbid intellectual functioning  Within these areas, circumscribed weaknesses were revealed in visuospatial judgment and estimation based on line angulation, and mental flexibility as measured with alphanumeric set switching  Direct comparison of current results to 2005 evaluation is made difficult by variations in measures used  Overall, her areas of weakness at that time included attention/concentration, information processing, and memory  At the current time, significantly decreased memory abilities (verbal memory, in particular) and deficiencies in aspects of attention/concentration are appreciated, and current performance may represent a decline from prior testing  On a task level, a decline is appreciated in visuospatial judgment and estimation based on line angulation (consistent with neuro-ophthalmologic issues reported)  The above cognitive deficits appear to substantially impact Ms Triana day-to-day functioning, as reported  She and her  have implemented numerous compensatory strategies which seem beneficial  Given the demonstrated impairment in learning and memory for verbal information, I strongly encourage them to capitalize upon more intact visual memory skills by using visual reminders such as written lists and notes  They already utilize frequent verbal reminders, visual reminders, and alarm clocks/timers for memory assistance  A referral to [facility] may be beneficial in assisting with additional compensatory strategies given cognitive difficulties described above  Also, I have encouraged Ms Teran to discuss medication options for memory/cognition with Dr Jacob Connor Maile Canavan      Psychological measures included in this evaluation reveal mild depressive symptomatology and endorsement of certain psychotic symptoms, though patient denied psychosis in consultation and feedback sessions  Thank you for this referral  This evaluation serves to establish Ms  Jeffrys new baseline  Given appreciated areas of difficulty, particularly memory, which may represent a decline from previous evaluation in 2005, please continually monitor Ms  Jeffrys cognitive functioning and re-refer in the future if you, patient, or family appreciate further declines in additional cognitive areas or functional abilities  Please feel free to contact me with any questions  Bibiana Serrano PsyD  Clinical Neuropsychologist      Total time for chart review, interview, test administration and scorin hours  Total time for report preparation and patient/family feedback: 3 hours

## 2018-10-29 ENCOUNTER — OFFICE VISIT (OUTPATIENT)
Dept: NEUROLOGY | Facility: CLINIC | Age: 48
End: 2018-10-29
Payer: COMMERCIAL

## 2018-10-29 VITALS
DIASTOLIC BLOOD PRESSURE: 69 MMHG | HEART RATE: 80 BPM | SYSTOLIC BLOOD PRESSURE: 127 MMHG | BODY MASS INDEX: 21.62 KG/M2 | HEIGHT: 70 IN | WEIGHT: 151 LBS

## 2018-10-29 DIAGNOSIS — G31.84 MILD COGNITIVE IMPAIRMENT: ICD-10-CM

## 2018-10-29 DIAGNOSIS — G35 MULTIPLE SCLEROSIS (HCC): Primary | ICD-10-CM

## 2018-10-29 PROCEDURE — 99214 OFFICE O/P EST MOD 30 MIN: CPT | Performed by: PHYSICIAN ASSISTANT

## 2018-10-29 RX ORDER — DONEPEZIL HYDROCHLORIDE 5 MG/1
5 TABLET, FILM COATED ORAL
Qty: 30 TABLET | Refills: 0 | Status: SHIPPED | OUTPATIENT
Start: 2018-10-29 | End: 2018-12-03 | Stop reason: SDUPTHER

## 2018-10-29 NOTE — PROGRESS NOTES
Patient ID: Srinivasan Schreiber is a 51 y o  female  Assessment/Plan:    [facility] ([facility]  Patient remains on Rebif and tolerating well  She denies any new MS symptoms at this time  She had labs done in September 2018 with normal CBC and LFTs  Last MRI brain updated in October 2017 in stable compared to 2 years prior  Her exam is stable today  Will have her follow up in 6 months or sooner if needed with labs before next visit  Mild cognitive impairment  Patient with longstanding memory issues  She recently had an updated neuro cognitive assessment with Dr Aman Dubois earlier this month  This did show significant impairment in memory, most notably verbal memory  She demonstrated difficulty learning new verbal information, even with multiple repetitions, and poor recall/retention of such information in the short-term and long-term  Dr Aman Dubois suggested continuing the compensatory strategies that have already been implemented by the patient and her   Patient and  have been doing a lot with no taking, calendars  This has been very helpful for her  She also suggested ST/OT she has done this in the past and was very taxing and draining for her  She would like to hold off on this at this time  Dr Aman Dubois also suggested possibly having a conversation about medications to help memory at this visit today  Patient recalls maybe being on Aricept in the past   She is not sure if anything happened with the medication, however she is interested in trying this again  Will start Aricept 5 mg daily and will do this for about a month before increasing to 10 mg daily if tolerated  We discussed keeping her mind busy and continuing to do her word search puzzles, reading  She should also stay socially and physically active  Her  is very helpful for her         Diagnoses and all orders for this visit:    Multiple sclerosis ([facility] )  -     CBC and differential; Future  -     Comprehensive metabolic panel; Future    Mild cognitive impairment  -     donepezil (ARICEPT) 5 mg tablet; Take 1 tablet (5 mg total) by mouth daily at bedtime           Subjective:    HPI    50 y/o female presents for MS follow up, last seen in April 2018  Patient was diagnosed with MS in 1993  Her initial symptoms were numbness of her hands and toes, difficulty with balance  She has also had longstanding difficulty with short-term memory, unable to work due to her cognitive difficulties  She relies on spouse for a lot of things  Patient was originally on Betaseron for 8 years at time of diagnosis  Due to decreased at the dizzy, she was changed to Copaxone  She was on Copaxone for about 7 years and then changed to Rebif  Patient remains on Rebif at this time  Last MRI brain was completed 10/6/2017 and stable compared to 2 years prior  There is global cerebral atrophy, greater than expected for patient's stated age  Patient has had a prior neuro cognitive evaluation in 2005  Today, patient presents with her spouse  She reports she is overall doing well from an MS standpoint  She continues to tolerate Rebif without difficulty  She had labs done on 09/21/2018 with normal CBC and LFTs  Earlier this month, she had a neuro cognitive assessment with Dr Soledad Rinaldi  This did show significant impairment in memory, most notably verbal memory  She demonstrated difficulty learning new verbal information, even with multiple repetitions, and poor recall/retention of such information in the short-term and long-term  Dr Soledad Rinaldi suggested continuing the compensatory strategies that have already been implemented by the patient and her   Patient and  have been doing a lot with no taking, calendars  This has been very helpful for her  She also suggested ST/OT she has done this in the past and was very taxing and draining for her  She would like to hold off on this at this time    Dr Soledad Rinaldi also suggested possibly having a conversation about medications to help memory at this visit today  Patient recalls maybe being on Aricept in the past   She is not sure if anything happened with the medication, however she is interested in trying this again  The following portions of the patient's history were reviewed and updated as appropriate: current medications, past family history, past medical history, past social history, past surgical history and problem list          Objective:    Blood pressure 127/69, pulse 80, height 5' 10" (1 778 m), weight 68 5 kg (151 lb)    Physical Exam   Constitutional: She appears well-developed and well-nourished  HENT:   Head: Normocephalic and atraumatic  Eyes: Pupils are equal, round, and reactive to light  EOM are normal    Cardiovascular: Intact distal pulses  Neurological: She has normal strength and normal reflexes  Coordination normal    Skin: Skin is warm and dry  Psychiatric: She has a normal mood and affect  Her speech is normal        Neurological Exam  Mental Status   Oriented to person, place, time and situation  Speech is normal  Language is fluent with no aphasia  Attention and concentration are normal   Difficulty with recall  Cranial Nerves  CN II: Visual fields full to confrontation  CN III, IV, VI: Extraocular movements intact bilaterally  Pupils equal round and reactive to light bilaterally  CN V: Facial sensation is normal   CN VII: Full and symmetric facial movement  CN VIII: Hearing is normal   CN IX, X: Palate elevates symmetrically  Normal gag reflex  CN XI: Shoulder shrug strength is normal   CN XII: Tongue midline without atrophy or fasciculations  Motor   Normal muscle tone  Strength is 5/5 throughout all four extremities  Sensory  Decreased vibratory sensation in lower extremities bilaterally   Reflexes  Deep tendon reflexes are 2+ and symmetric in all four extremities with downgoing toes bilaterally      Coordination  Finger-to-nose, rapid alternating movements and heel-to-shin normal bilaterally without dysmetria  Gait  Casual gait is normal including stance, stride, and arm swing  ROS:    Review of Systems   Constitutional: Positive for fatigue  Negative for appetite change and fever  HENT: Positive for congestion and sinus pressure  Negative for hearing loss, tinnitus, trouble swallowing and voice change  Eyes: Negative  Negative for photophobia and pain  Respiratory: Negative  Negative for shortness of breath  Cardiovascular: Negative  Negative for palpitations  Gastrointestinal: Negative  Negative for nausea and vomiting  Endocrine: Negative  Negative for cold intolerance and heat intolerance  Menstrual changes, loss of sexual drive   Genitourinary: Positive for frequency and urgency  Negative for dysuria  Musculoskeletal: Negative  Negative for myalgias and neck pain  Skin: Negative  Negative for rash  Allergic/Immunologic: Negative  Neurological: Positive for numbness  Negative for dizziness, tremors, seizures, syncope, facial asymmetry, speech difficulty, weakness, light-headedness and headaches  Increased sleepiness, waking up at night, memory problems, tingling, snoring, clumsiness, balance problems, difficulty waking   Hematological: Negative  Does not bruise/bleed easily  Psychiatric/Behavioral: Positive for confusion  Negative for hallucinations and sleep disturbance          Mood swings     I personally reviewed the ROS

## 2018-10-29 NOTE — PATIENT INSTRUCTIONS
Continue compensatory strategies for memory such as note taking, calendars  Will try Arciept 5mg x 1 month and if tolerating, will increase to 10mg  Please call me in about 3 weeks to let me know how you are doing with the med  Continue Rebif  Will have you follow up in 6 months or sooner if needed  Please get labs done before that visit    Call for any new or worsening symptoms

## 2018-10-30 NOTE — ASSESSMENT & PLAN NOTE
Patient with longstanding memory issues  She recently had an updated neuro cognitive assessment with Dr Jessica Gross earlier this month  This did show significant impairment in memory, most notably verbal memory  She demonstrated difficulty learning new verbal information, even with multiple repetitions, and poor recall/retention of such information in the short-term and long-term  Dr Jessica Gross suggested continuing the compensatory strategies that have already been implemented by the patient and her   Patient and  have been doing a lot with no taking, calendars  This has been very helpful for her  She also suggested ST/OT she has done this in the past and was very taxing and draining for her  She would like to hold off on this at this time  Dr Jessica Gross also suggested possibly having a conversation about medications to help memory at this visit today  Patient recalls maybe being on Aricept in the past   She is not sure if anything happened with the medication, however she is interested in trying this again  Will start Aricept 5 mg daily and will do this for about a month before increasing to 10 mg daily if tolerated  We discussed keeping her mind busy and continuing to do her word search puzzles, reading  She should also stay socially and physically active  Her  is very helpful for her

## 2018-10-30 NOTE — ASSESSMENT & PLAN NOTE
Patient remains on Rebif and tolerating well  She denies any new MS symptoms at this time  She had labs done in September 2018 with normal CBC and LFTs  Last MRI brain updated in October 2017 in stable compared to 2 years prior  Her exam is stable today  Will have her follow up in 6 months or sooner if needed with labs before next visit

## 2018-12-03 DIAGNOSIS — G31.84 MILD COGNITIVE IMPAIRMENT: ICD-10-CM

## 2018-12-03 RX ORDER — DONEPEZIL HYDROCHLORIDE 5 MG/1
TABLET, FILM COATED ORAL
Qty: 60 TABLET | Refills: 3 | Status: SHIPPED | OUTPATIENT
Start: 2018-12-03 | End: 2019-05-14 | Stop reason: SDUPTHER

## 2018-12-03 NOTE — TELEPHONE ENCOUNTER
Please see if patient tolerating 5mg of Aricept    I discussed that after a month, we would increase to 10mg if tolerating

## 2019-01-07 ENCOUNTER — TELEPHONE (OUTPATIENT)
Dept: NEUROLOGY | Facility: CLINIC | Age: 49
End: 2019-01-07

## 2019-01-07 NOTE — TELEPHONE ENCOUNTER
Patient's  called stating he has been trying to fax disability paperwork for completion to our office since November  He provided a fax number that I did not recognize  I provided him with the back fax number and asked that he berny it attn Shahana Reyna states that patient has been disabled for approx 10 years and is not working at all  He is aware of fee associated with forms and the two week turn around time  He confirms he would like the forms to be completed and states we have completed this form every year    Will await receipt of the forms

## 2019-01-15 NOTE — TELEPHONE ENCOUNTER
Please complete and we will review on Thursday    Did pt have permanent disability in the past and this is just a renewal?

## 2019-01-15 NOTE — TELEPHONE ENCOUNTER
Dr Hoang Romano, patient requesting permanent disability  Forms received  We have completed these forms in the past for this patient  Ok to complete and have you sign on Thursday in [facility]?

## 2019-01-16 NOTE — TELEPHONE ENCOUNTER
Forms completed, will have Dr Valerie Wallace review and sign tomorrow in [facility]

## 2019-01-17 NOTE — TELEPHONE ENCOUNTER
Dr Bautista Share not in office today  Emailed to Jun GARCIA to print and have Dr Bautista Share sign tomorrow  Once signed, please scan into patient's chart and send to [facility]  so they can drop charge of $15 and call patient

## 2019-01-18 NOTE — TELEPHONE ENCOUNTER
Forms has been completed and signed  Please get payment of $15 and fax form located on forms  Forms are under Media

## 2019-01-18 NOTE — TELEPHONE ENCOUNTER
Dropped the $15 charge  Called pt and obtained payment over the phone  Pt requested receipt and form mailed home  Also requested form be faxed to fax number listed  Forms faxed and mailed home to pt

## 2019-02-13 ENCOUNTER — TELEPHONE (OUTPATIENT)
Dept: UROLOGY | Facility: MEDICAL CENTER | Age: 49
End: 2019-02-13

## 2019-02-13 NOTE — TELEPHONE ENCOUNTER
New Patient Intake form:    Complaint/Diagnosis:  Needs medication has not been seen in over 3 years    [address]    History of Cancer: no    Previous urologist: Sydnie Patricia 2014     Outside Testing/where:n/a    If yes, what kind:    Records Requested/Where:na    Preferred location:[place]

## 2019-02-21 ENCOUNTER — OFFICE VISIT (OUTPATIENT)
Dept: FAMILY MEDICINE CLINIC | Facility: CLINIC | Age: 49
End: 2019-02-21
Payer: COMMERCIAL

## 2019-02-21 VITALS
HEIGHT: 70 IN | TEMPERATURE: 98.3 F | SYSTOLIC BLOOD PRESSURE: 106 MMHG | WEIGHT: 159.6 LBS | DIASTOLIC BLOOD PRESSURE: 72 MMHG | HEART RATE: 78 BPM | BODY MASS INDEX: 22.85 KG/M2

## 2019-02-21 DIAGNOSIS — J06.9 UPPER RESPIRATORY TRACT INFECTION, UNSPECIFIED TYPE: Primary | ICD-10-CM

## 2019-02-21 DIAGNOSIS — G35 MULTIPLE SCLEROSIS (HCC): ICD-10-CM

## 2019-02-21 PROCEDURE — 99213 OFFICE O/P EST LOW 20 MIN: CPT | Performed by: FAMILY MEDICINE

## 2019-02-21 PROCEDURE — 3008F BODY MASS INDEX DOCD: CPT | Performed by: FAMILY MEDICINE

## 2019-02-21 RX ORDER — AZITHROMYCIN 250 MG/1
TABLET, FILM COATED ORAL
Qty: 6 TABLET | Refills: 0 | Status: SHIPPED | OUTPATIENT
Start: 2019-02-21 | End: 2019-02-25

## 2019-02-21 RX ORDER — BROMPHENIRAMINE MALEATE, PSEUDOEPHEDRINE HYDROCHLORIDE, AND DEXTROMETHORPHAN HYDROBROMIDE 2; 30; 10 MG/5ML; MG/5ML; MG/5ML
10 SYRUP ORAL 4 TIMES DAILY PRN
Qty: 180 ML | Refills: 0 | Status: SHIPPED | OUTPATIENT
Start: 2019-02-21 | End: 2019-09-16 | Stop reason: ALTCHOICE

## 2019-02-22 NOTE — PROGRESS NOTES
Patient ID: Kiana Thakkar is a 51 y o  female  HPI: 51 y  o female presenting with 3 day history of sore throat, nasal congestion, ear pain,pnd and cough  Pt denies any fever, chills, or body aches        SUBJECTIVE    Family History   Problem Relation Age of Onset    Addiction problem Father         alcohol    Liver disease Father     Heart disease Father     Hypertension Father     Colon cancer Maternal Aunt     Diabetes Maternal Aunt      Social History     Socioeconomic History    Marital status: /Civil Union     Spouse name: Not on file    Number of children: Not on file    Years of education: Not on file    Highest education level: Not on file   Occupational History    Not on file   Social Needs    Financial resource strain: Not on file    Food insecurity:     Worry: Not on file     Inability: Not on file    Transportation needs:     Medical: Not on file     Non-medical: Not on file   Tobacco Use    Smoking status: Never Smoker    Smokeless tobacco: Never Used   Substance and Sexual Activity    Alcohol use: Yes     Comment: occassionally    Drug use: No    Sexual activity: Not on file   Lifestyle    Physical activity:     Days per week: Not on file     Minutes per session: Not on file    Stress: Not on file   Relationships    Social connections:     Talks on phone: Not on file     Gets together: Not on file     Attends [employer] service: Not on file     Active member of club or organization: Not on file     Attends meetings of clubs or organizations: Not on file     Relationship status: Not on file    Intimate partner violence:     Fear of current or ex partner: Not on file     Emotionally abused: Not on file     Physically abused: Not on file     Forced sexual activity: Not on file   Other Topics Concern    Not on file   Social History Narrative    Caffeine use    Currently      Past Medical History:   Diagnosis Date    [facility] ([facility] )     Neurogenic bladder Past Surgical History:   Procedure Laterality Date    PA COLONOSCOPY FLX DX W/COLLJ SPEC WHEN PFRMD N/A 6/8/2018    Procedure: COLONOSCOPY;  Surgeon: Marleen Aggarwal MD;  Location: [facility]; Service: Gastroenterology    WISDOM TOOTH EXTRACTION       Allergies   Allergen Reactions    Clindamycin     Penicillins        Current Outpatient Medications:     azithromycin (ZITHROMAX) 250 mg tablet, Take 2 tablets today then 1 tablet daily x 4 days, Disp: 6 tablet, Rfl: 0    bisacodyl (DULCOLAX) 5 mg EC tablet, Take 2 tablets (10 mg total) by mouth once for 1 dose, Disp: 2 tablet, Rfl: 0    brompheniramine-pseudoephedrine-DM 30-2-10 MG/5ML syrup, Take 10 mL by mouth 4 (four) times a day as needed for congestion or cough, Disp: 180 mL, Rfl: 0    Cholecalciferol (VITAMIN D PO), Take by mouth, Disp: , Rfl:     Cyanocobalamin (VITAMIN B-12 PO), Take by mouth, Disp: , Rfl:     donepezil (ARICEPT) 5 mg tablet, Take 2 tabs po QHS, Disp: 60 tablet, Rfl: 3    levonorgestrel-ethinyl estradiol (RAMIN-28) 0 15-30 MG-MCG per tablet, Take by mouth, Disp: , Rfl:     lubiprostone (AMITIZA) 24 mcg capsule, Take 1 capsule (24 mcg total) by mouth 2 (two) times a day with meals, Disp: 60 capsule, Rfl: 6    nitrofurantoin (MACROBID) 100 mg capsule, Take by mouth, Disp: , Rfl:     Omega-3 Fatty Acids (FISH OIL) 1200 MG CAPS, Take by mouth, Disp: , Rfl:     REBIF 44 MCG/0 5ML SOSY, INJECT 44 MCG (0 5 ML) UNDER THE SKIN THREE TIMES A WEEK AS DIRECTED, Disp: 6 mL, Rfl: 9    Review of Systems  Constitutional:     Denies fever, chills ,fatigue ,weakness ,weight loss, weight gain     ENT: Denies loss of hearing ,nosebleed, nasal discharge ,hoarseness; but admits to nasal congestion , sore throat and ear pain      Pulmonary: Denies shortness of breath ,dyspnea on exertion, orthopnea ; but admits to cough and pnd  Cardiovascular:  Denies bradycardia , tachycardia  ,palpations, lower extremity edema leg, claudication  Breast: Denies new or changing breast lumps ,nipple discharge ,nipple changes  Abdomen:  Denies abdominal pain , anorexia , indigestion, nausea, vomiting, constipation, diarrhea  Musculoskeletal: Denies myalgias, arthralgias, joint swelling, joint stiffness , limb pain, limb swelling  Lymph: + swollen glands  Gu: Denies polyuria or dysuria  Skin: Denies skin rash, skin lesion, skin wound, itching, dry skin  Neuro: Denies headache, numbness, tingling, confusion, loss of consciousness, dizziness, vertigo  Psychiatric: Denies feelings of depression, suicidal ideation, anxiety, sleep disturbances    OBJECTIVE  /72 (BP Location: Right arm, Patient Position: Sitting, Cuff Size: Standard)   Pulse 78   Temp 98 3 °F (36 8 °C)   Ht 5' 10" (1 778 m)   Wt 72 4 kg (159 lb 9 6 oz)   BMI 22 90 kg/m²   Constitutional:   NAD, well appearing and well nourished     ENT:   Conjunctiva and lids: no injection, edema, or discharge    Pupils and iris: MARGARITA bilaterally  External inspection of ears and nose: normal without deformities or discharge  Otoscopic exam: Canals patent without erythema, tm dull and erythematous, effusions bilaterally   Nasal mucosa, septum and turbinates: + turbinate injection, nasal discharge         Oropharynx:  Moist mucosa, normal tongue and tonsils without lesions  + erythema and injection of posterior pharynx with pnd    Pulmonary:Respiratory effort normal rate and rhythm, no increased work of breathing   Auscultation of lungs:  Clear bilaterally with no adventitious breath sounds     Cardiovascular: regular rate and rhythm, S1 and S2, no murmur, no edema and/or varicosities of LE     Abdomen: Soft and nontender with + bowel sounds  No heptomegaly or splenomegaly     Gu: no suprapubic tenderness or CVA tenderness  Lymphatic: + anterior  cervical lymphadenopathy      Musculoskeletal:  Gait and station: Normal gait      Digits and nails normal without clubbing or cyanosis      Inspection/palpation of joints, bones, and muscles:  No joint tenderness, swelling, full active and passive range of motion       Skin: Normal skin turgor and no rashes      Neuro:    Normal reflexes  Pych:   alert and oriented to person, place and time     normal mood and affect      Assessment/Plan:Diagnoses and all orders for this visit:    Upper respiratory tract infection, unspecified type  -     brompheniramine-pseudoephedrine-DM 30-2-10 MG/5ML syrup; Take 10 mL by mouth 4 (four) times a day as needed for congestion or cough  -     azithromycin (ZITHROMAX) 250 mg tablet; Take 2 tablets today then 1 tablet daily x 4 days    [facility]        Reviewed with patient plan to treat with above treatment      Patient instructed to call in 72 hours if not feeling better or if symptoms worsen

## 2019-03-09 DIAGNOSIS — G35 MULTIPLE SCLEROSIS (HCC): ICD-10-CM

## 2019-03-09 RX ORDER — INTERFERON BETA-1A 44 UG/.5ML
INJECTION, SOLUTION SUBCUTANEOUS
Qty: 6 ML | Refills: 9 | Status: SHIPPED | OUTPATIENT
Start: 2019-03-09 | End: 2020-02-19 | Stop reason: SDUPTHER

## 2019-03-11 NOTE — TELEPHONE ENCOUNTER
Per Sonia's note, patient is to have labs drawn before her follow up visit in May, "Will have her follow up in 6 months or sooner if needed with labs before next visit "  Called and left detailed message reminding patient to have labs drawn

## 2019-04-18 ENCOUNTER — OFFICE VISIT (OUTPATIENT)
Dept: UROLOGY | Facility: AMBULATORY SURGERY CENTER | Age: 49
End: 2019-04-18
Payer: COMMERCIAL

## 2019-04-18 VITALS
WEIGHT: 160.4 LBS | SYSTOLIC BLOOD PRESSURE: 116 MMHG | BODY MASS INDEX: 22.96 KG/M2 | DIASTOLIC BLOOD PRESSURE: 66 MMHG | HEART RATE: 78 BPM | HEIGHT: 70 IN

## 2019-04-18 DIAGNOSIS — N31.9 NEUROGENIC BLADDER: Primary | ICD-10-CM

## 2019-04-18 LAB
SL AMB  POCT GLUCOSE, UA: NORMAL
SL AMB LEUKOCYTE ESTERASE,UA: NORMAL
SL AMB POCT BILIRUBIN,UA: NORMAL
SL AMB POCT BLOOD,UA: NORMAL
SL AMB POCT CLARITY,UA: CLEAR
SL AMB POCT COLOR,UA: YELLOW
SL AMB POCT KETONES,UA: NORMAL
SL AMB POCT NITRITE,UA: NORMAL
SL AMB POCT PH,UA: 5
SL AMB POCT SPECIFIC GRAVITY,UA: 1.01
SL AMB POCT URINE PROTEIN: NORMAL
SL AMB POCT UROBILINOGEN: NORMAL

## 2019-04-18 PROCEDURE — 99203 OFFICE O/P NEW LOW 30 MIN: CPT | Performed by: UROLOGY

## 2019-04-18 PROCEDURE — 81002 URINALYSIS NONAUTO W/O SCOPE: CPT | Performed by: UROLOGY

## 2019-04-18 RX ORDER — NITROFURANTOIN 25; 75 MG/1; MG/1
100 CAPSULE ORAL DAILY PRN
Qty: 90 CAPSULE | Refills: 3 | Status: SHIPPED | OUTPATIENT
Start: 2019-04-18 | End: 2020-04-24 | Stop reason: SDUPTHER

## 2019-05-14 ENCOUNTER — APPOINTMENT (OUTPATIENT)
Dept: LAB | Facility: CLINIC | Age: 49
End: 2019-05-14
Payer: COMMERCIAL

## 2019-05-14 ENCOUNTER — OFFICE VISIT (OUTPATIENT)
Dept: NEUROLOGY | Facility: CLINIC | Age: 49
End: 2019-05-14
Payer: COMMERCIAL

## 2019-05-14 VITALS
WEIGHT: 163.8 LBS | BODY MASS INDEX: 23.5 KG/M2 | HEART RATE: 80 BPM | SYSTOLIC BLOOD PRESSURE: 100 MMHG | DIASTOLIC BLOOD PRESSURE: 62 MMHG

## 2019-05-14 DIAGNOSIS — G35 MULTIPLE SCLEROSIS (HCC): Primary | ICD-10-CM

## 2019-05-14 DIAGNOSIS — G31.84 MILD COGNITIVE IMPAIRMENT: ICD-10-CM

## 2019-05-14 DIAGNOSIS — G35 MULTIPLE SCLEROSIS (HCC): ICD-10-CM

## 2019-05-14 LAB
ALBUMIN SERPL BCP-MCNC: 3.5 G/DL (ref 3.5–5)
ALP SERPL-CCNC: 74 U/L (ref 46–116)
ALT SERPL W P-5'-P-CCNC: 28 U/L (ref 12–78)
AST SERPL W P-5'-P-CCNC: 23 U/L (ref 5–45)
BASOPHILS # BLD AUTO: 0.04 THOUSANDS/ΜL (ref 0–0.1)
BASOPHILS NFR BLD AUTO: 1 % (ref 0–1)
BILIRUB DIRECT SERPL-MCNC: 0.12 MG/DL (ref 0–0.2)
BILIRUB SERPL-MCNC: 0.6 MG/DL (ref 0.2–1)
EOSINOPHIL # BLD AUTO: 0.13 THOUSAND/ΜL (ref 0–0.61)
EOSINOPHIL NFR BLD AUTO: 2 % (ref 0–6)
ERYTHROCYTE [DISTWIDTH] IN BLOOD BY AUTOMATED COUNT: 13.3 % (ref 11.6–15.1)
HCT VFR BLD AUTO: 43.4 % (ref 34.8–46.1)
HGB BLD-MCNC: 13.8 G/DL (ref 11.5–15.4)
IMM GRANULOCYTES # BLD AUTO: 0.02 THOUSAND/UL (ref 0–0.2)
IMM GRANULOCYTES NFR BLD AUTO: 0 % (ref 0–2)
LYMPHOCYTES # BLD AUTO: 2.16 THOUSANDS/ΜL (ref 0.6–4.47)
LYMPHOCYTES NFR BLD AUTO: 35 % (ref 14–44)
MCH RBC QN AUTO: 30.9 PG (ref 26.8–34.3)
MCHC RBC AUTO-ENTMCNC: 31.8 G/DL (ref 31.4–37.4)
MCV RBC AUTO: 97 FL (ref 82–98)
MONOCYTES # BLD AUTO: 0.42 THOUSAND/ΜL (ref 0.17–1.22)
MONOCYTES NFR BLD AUTO: 7 % (ref 4–12)
NEUTROPHILS # BLD AUTO: 3.49 THOUSANDS/ΜL (ref 1.85–7.62)
NEUTS SEG NFR BLD AUTO: 55 % (ref 43–75)
NRBC BLD AUTO-RTO: 0 /100 WBCS
PLATELET # BLD AUTO: 251 THOUSANDS/UL (ref 149–390)
PMV BLD AUTO: 10.7 FL (ref 8.9–12.7)
PROT SERPL-MCNC: 7.3 G/DL (ref 6.4–8.2)
RBC # BLD AUTO: 4.46 MILLION/UL (ref 3.81–5.12)
WBC # BLD AUTO: 6.26 THOUSAND/UL (ref 4.31–10.16)

## 2019-05-14 PROCEDURE — 99214 OFFICE O/P EST MOD 30 MIN: CPT | Performed by: PSYCHIATRY & NEUROLOGY

## 2019-05-14 PROCEDURE — 85025 COMPLETE CBC W/AUTO DIFF WBC: CPT

## 2019-05-14 PROCEDURE — 80076 HEPATIC FUNCTION PANEL: CPT

## 2019-05-14 PROCEDURE — 36415 COLL VENOUS BLD VENIPUNCTURE: CPT

## 2019-05-14 RX ORDER — DONEPEZIL HYDROCHLORIDE 5 MG/1
TABLET, FILM COATED ORAL
Qty: 90 TABLET | Refills: 3 | Status: SHIPPED | OUTPATIENT
Start: 2019-05-14 | End: 2020-04-22

## 2019-05-15 ENCOUNTER — TELEPHONE (OUTPATIENT)
Dept: NEUROLOGY | Facility: CLINIC | Age: 49
End: 2019-05-15

## 2019-09-16 ENCOUNTER — OFFICE VISIT (OUTPATIENT)
Dept: FAMILY MEDICINE CLINIC | Facility: CLINIC | Age: 49
End: 2019-09-16
Payer: COMMERCIAL

## 2019-09-16 VITALS
DIASTOLIC BLOOD PRESSURE: 76 MMHG | SYSTOLIC BLOOD PRESSURE: 110 MMHG | TEMPERATURE: 98.4 F | HEART RATE: 80 BPM | WEIGHT: 162.25 LBS | BODY MASS INDEX: 23.23 KG/M2 | HEIGHT: 70 IN

## 2019-09-16 DIAGNOSIS — J06.9 UPPER RESPIRATORY TRACT INFECTION, UNSPECIFIED TYPE: Primary | ICD-10-CM

## 2019-09-16 PROCEDURE — 99213 OFFICE O/P EST LOW 20 MIN: CPT | Performed by: FAMILY MEDICINE

## 2019-09-16 PROCEDURE — 3008F BODY MASS INDEX DOCD: CPT | Performed by: FAMILY MEDICINE

## 2019-09-16 RX ORDER — AZITHROMYCIN 250 MG/1
TABLET, FILM COATED ORAL
Qty: 6 TABLET | Refills: 0 | Status: SHIPPED | OUTPATIENT
Start: 2019-09-16 | End: 2019-09-20

## 2019-09-16 RX ORDER — BROMPHENIRAMINE MALEATE, PSEUDOEPHEDRINE HYDROCHLORIDE, AND DEXTROMETHORPHAN HYDROBROMIDE 2; 30; 10 MG/5ML; MG/5ML; MG/5ML
10 SYRUP ORAL 4 TIMES DAILY PRN
Qty: 180 ML | Refills: 0 | Status: SHIPPED | OUTPATIENT
Start: 2019-09-16 | End: 2020-01-02 | Stop reason: ALTCHOICE

## 2019-09-16 NOTE — PROGRESS NOTES
Patient ID: Aleksey Leo is a 47 y o  female  HPI: 47 y  o female presenting with 3 day history of sore throat, nasal congestion, ear pain,pnd and cough  Pt denies any fever, chills, or body aches  She has MS and is concerned whenever she isn't feeling well        SUBJECTIVE    Family History   Problem Relation Age of Onset    Addiction problem Father         alcohol    Liver disease Father     Heart disease Father     Hypertension Father     Colon cancer Maternal Aunt     Diabetes Maternal Aunt      Social History     Socioeconomic History    Marital status: /Civil Union     Spouse name: Not on file    Number of children: Not on file    Years of education: Not on file    Highest education level: Not on file   Occupational History    Not on file   Social Needs    Financial resource strain: Not on file    Food insecurity:     Worry: Not on file     Inability: Not on file    Transportation needs:     Medical: Not on file     Non-medical: Not on file   Tobacco Use    Smoking status: Never Smoker    Smokeless tobacco: Never Used   Substance and Sexual Activity    Alcohol use: Yes     Comment: occassionally    Drug use: No    Sexual activity: Not on file   Lifestyle    Physical activity:     Days per week: Not on file     Minutes per session: Not on file    Stress: Not on file   Relationships    Social connections:     Talks on phone: Not on file     Gets together: Not on file     Attends Protestant service: Not on file     Active member of club or organization: Not on file     Attends meetings of clubs or organizations: Not on file     Relationship status: Not on file    Intimate partner violence:     Fear of current or ex partner: Not on file     Emotionally abused: Not on file     Physically abused: Not on file     Forced sexual activity: Not on file   Other Topics Concern    Not on file   Social History Narrative    Caffeine use    Currently      Past Medical History: Diagnosis Date    Multiple sclerosis ([facility] [address] )     Neurogenic bladder      Past Surgical History:   Procedure Laterality Date    ME COLONOSCOPY FLX DX W/COLLJ SPEC WHEN PFRMD N/A 6/8/2018    Procedure: COLONOSCOPY;  Surgeon: Shana Stephenson MD;  [facility]; Service: Gastroenterology    WISDOM TOOTH EXTRACTION       Allergies   Allergen Reactions    Clindamycin     Penicillins        Current Outpatient Medications:     bisacodyl (DULCOLAX) 5 mg EC tablet, Take 2 tablets (10 mg total) by mouth once for 1 dose, Disp: 2 tablet, Rfl: 0    Cholecalciferol (VITAMIN D PO), Take by mouth, Disp: , Rfl:     Cyanocobalamin (VITAMIN B-12 PO), Take by mouth, Disp: , Rfl:     donepezil (ARICEPT) 5 mg tablet, Take 1 tab po QHS, Disp: 90 tablet, Rfl: 3    levonorgestrel-ethinyl estradiol (RAMIN-28) 0 15-30 MG-MCG per tablet, Take by mouth, Disp: , Rfl:     lubiprostone (AMITIZA) 24 mcg capsule, Take 1 capsule (24 mcg total) by mouth 2 (two) times a day with meals, Disp: 60 capsule, Rfl: 6    nitrofurantoin (MACROBID) 100 mg capsule, Take 1 capsule (100 mg total) by mouth daily as needed (one tablet as needed after intercourse), Disp: 90 capsule, Rfl: 3    Omega-3 Fatty Acids (FISH OIL) 1200 MG CAPS, Take by mouth, Disp: , Rfl:     REBIF 44 MCG/0 5ML SOSY, INJECT 44 MCG (0 5 ML) UNDER THE SKIN THREE TIMES A WEEK AS DIRECTED, Disp: 6 mL, Rfl: 9    azithromycin (ZITHROMAX) 250 mg tablet, Take 2 tablets today then 1 tablet daily x 4 days, Disp: 6 tablet, Rfl: 0    brompheniramine-pseudoephedrine-DM 30-2-10 MG/5ML syrup, Take 10 mL by mouth 4 (four) times a day as needed for congestion or cough, Disp: 180 mL, Rfl: 0    Review of Systems  Constitutional:     Denies fever, chills ,fatigue ,weakness ,weight loss, weight gain     ENT: Denies loss of hearing ,nosebleed, nasal discharge ,hoarseness; but admits to nasal congestion , sore throat and ear pain      Pulmonary: Denies shortness of breath ,dyspnea on exertion, orthopnea ; but admits to cough and pnd  Cardiovascular:  Denies bradycardia , tachycardia  ,palpations, lower extremity edema leg, claudication  Breast:  Denies new or changing breast lumps ,nipple discharge ,nipple changes  Abdomen:  Denies abdominal pain , anorexia , indigestion, nausea, vomiting, constipation, diarrhea  Musculoskeletal: Denies myalgias, arthralgias, joint swelling, joint stiffness , limb pain, limb swelling  Lymph: + swollen glands  Gu: Denies polyuria or dysuria  Skin: Denies skin rash, skin lesion, skin wound, itching, dry skin  Neuro: Denies headache, numbness, tingling, confusion, loss of consciousness, dizziness, vertigo  Psychiatric: Denies feelings of depression, suicidal ideation, anxiety, sleep disturbances    OBJECTIVE  /76   Pulse 80   Temp 98 4 °F (36 9 °C)   Ht 5' 10" (1 778 m)   Wt 73 6 kg (162 lb 4 oz)   BMI 23 28 kg/m²   Constitutional:   NAD, well appearing and well nourished     ENT:   Conjunctiva and lids: no injection, edema, or discharge    Pupils and iris: MARGARITA bilaterally  External inspection of ears and nose: normal without deformities or discharge  Otoscopic exam: Canals patent without erythema, tm dull and erythematous, effusions bilaterally   Nasal mucosa, septum and turbinates: + turbinate injection, nasal discharge         Oropharynx:  Moist mucosa, normal tongue and tonsils without lesions  + erythema and injection of posterior pharynx with pnd    Pulmonary:Respiratory effort normal rate and rhythm, no increased work of breathing   Auscultation of lungs:  Clear bilaterally with no adventitious breath sounds     Cardiovascular: regular rate and rhythm, S1 and S2, no murmur, no edema and/or varicosities of LE     Abdomen: Soft and nontender with + bowel sounds  No heptomegaly or splenomegaly     Gu: no suprapubic tenderness or CVA tenderness  Lymphatic: + anterior  cervical lymphadenopathy      Musculoskeletal:  Gait and station: Normal gait Digits and nails normal without clubbing or cyanosis      Inspection/palpation of joints, bones, and muscles:  No joint tenderness, swelling, full active and passive range of motion       Skin: Normal skin turgor and no rashes      Neuro:    Normal reflexes  Pych:   alert and oriented to person, place and time     normal mood and affect      Assessment/Plan:Diagnoses and all orders for this visit:    Upper respiratory tract infection, unspecified type  -     azithromycin (ZITHROMAX) 250 mg tablet; Take 2 tablets today then 1 tablet daily x 4 days  -     brompheniramine-pseudoephedrine-DM 30-2-10 MG/5ML syrup; Take 10 mL by mouth 4 (four) times a day as needed for congestion or cough        Reviewed with patient plan to treat with above plan      Patient instructed to call in 72 hours if not feeling better or if symptoms worsen

## 2019-09-23 ENCOUNTER — DOCUMENTATION (OUTPATIENT)
Dept: NEUROSURGERY | Facility: CLINIC | Age: 49
End: 2019-09-23

## 2019-10-08 ENCOUNTER — TELEPHONE (OUTPATIENT)
Dept: NEUROLOGY | Facility: CLINIC | Age: 49
End: 2019-10-08

## 2019-10-08 NOTE — TELEPHONE ENCOUNTER
----- Message from Quyen Hogan MD sent at 10/7/2019  9:54 AM EDT -----      ----- Message -----  From: Corina Felty  Sent: 10/7/2019   9:32 AM EDT  To: Quyen Hogan MD

## 2019-10-10 ENCOUNTER — IMMUNIZATIONS (OUTPATIENT)
Dept: FAMILY MEDICINE CLINIC | Facility: CLINIC | Age: 49
End: 2019-10-10
Payer: COMMERCIAL

## 2019-10-10 DIAGNOSIS — Z23 ENCOUNTER FOR IMMUNIZATION: ICD-10-CM

## 2019-10-10 PROCEDURE — 90686 IIV4 VACC NO PRSV 0.5 ML IM: CPT | Performed by: FAMILY MEDICINE

## 2019-10-10 PROCEDURE — G0008 ADMIN INFLUENZA VIRUS VAC: HCPCS | Performed by: FAMILY MEDICINE

## 2019-11-01 ENCOUNTER — TELEPHONE (OUTPATIENT)
Dept: NEUROLOGY | Facility: CLINIC | Age: 49
End: 2019-11-01

## 2019-11-05 ENCOUNTER — OFFICE VISIT (OUTPATIENT)
Dept: NEUROLOGY | Facility: CLINIC | Age: 49
End: 2019-11-05
Payer: COMMERCIAL

## 2019-11-05 VITALS
DIASTOLIC BLOOD PRESSURE: 65 MMHG | HEIGHT: 70 IN | HEART RATE: 81 BPM | SYSTOLIC BLOOD PRESSURE: 130 MMHG | RESPIRATION RATE: 12 BRPM | BODY MASS INDEX: 23.19 KG/M2 | WEIGHT: 162 LBS

## 2019-11-05 DIAGNOSIS — G31.84 MILD COGNITIVE IMPAIRMENT: ICD-10-CM

## 2019-11-05 DIAGNOSIS — G35 MULTIPLE SCLEROSIS (HCC): Primary | ICD-10-CM

## 2019-11-05 PROCEDURE — 99214 OFFICE O/P EST MOD 30 MIN: CPT | Performed by: PHYSICIAN ASSISTANT

## 2019-11-05 RX ORDER — RIBOFLAVIN (VITAMIN B2) 100 MG
100 TABLET ORAL DAILY
COMMUNITY

## 2019-11-05 NOTE — PATIENT INSTRUCTIONS
Continue Rebif    Continue Aricept 5mg daily  Please get labs done at this time  I entered PT script if you need it  Follow up in May 2020 as already scheduled with Dr Roya Gay

## 2019-11-05 NOTE — PROGRESS NOTES
Patient ID: Jimmy Ortiz is a 47 y o  female  Assessment/Plan:    Multiple sclerosis (HCC)  Currently on Rebif, remains overall stable  No new symptoms at this time  She reports some increased dragging of the right foot with increased fatigue, and also feels change in weather causes this to happen  This is a long-standing symptom and she has a Bioness stimulator that she has not been wearing  I suggested PT and wearing the Bioness again (PT could eval if this still fits and could do PT sessions with the stimulator), but patient and  would like to wait and see if it "calms down"  Again, she says this happens a lot as seasons change  PT order placed so it is in there if she decides to go  Last labs completed May 2019 with normal CBC and LFTs  Will update labs at this time  Last MRI brain 10/2017 stable compared to 2 years prior  Her 2015 exam was compared to 2002 and overall stable except for 1 new lesion   -discussed updating again next year    Exam is stable today  Mild cognitive impairment  Patient tolerating Aricept 5mg  She had increased GI side effects at 10mg dosing  Will continue 5mg  Suggested keeping her mind active and busy with word searches, reading  She should continue to stay socially and physically active as well  Patient and  continue to implement compensatory strategies  Patient will follow up in 6 months or sooner if needed  She was advised to call the office for any new or worsening symptoms  Diagnoses and all orders for this visit:    Multiple sclerosis ([facility] [address] )  -     CBC and differential; Future  -     Comprehensive metabolic panel; Future  -     Ambulatory referral to Physical Therapy; Future    Mild cognitive impairment    Other orders         Subjective:    HPI    Patient is a 47year old female who presents for MS follow up, last seen in May 2019  Patient was diagnosed with MS in 1993    Her initial symptoms were numbness of her hands and toes, difficulty with balance  She has also had longstanding difficulty with short-term memory, unable to work due to her cognitive difficulties  Patient was originally on Betaseron for 8 years at time of diagnosis  She was then changed to Copaxone  She was on Copaxone for about 7 years and then changed to Rebif  Patient remains on Rebif at this time  Last MRI brain was completed 10/6/2017 and stable compared to 2 years prior  There is global cerebral atrophy, greater than expected for patient's stated age  Patient had a neuro cognitive assessment with Dr Marko Jones  This did show significant impairment in memory, most notably verbal memory  She demonstrated difficulty learning new verbal information, even with multiple repetitions, and poor recall/retention of such information in the short-term and long-term  Dr Marko Jonse suggested continuing the compensatory strategies that have already been implemented by the patient and her   Patient and  have been doing a lot with note taking, calendars  This has been very helpful for her  Dr Marko Jones also suggested ST/OT which patient has done in the past and was very taxing and draining for her  Dr Marko Jones also suggested possibly having a conversation about medications to help memory  Patient was started on Aricept 5mg  She had increased GI side effects at the 10mg dose, so it was reduced back to 5mg  Today, patient reports she is overall doing well  She denies any new symptoms at this time  Patient and  report that her right foot has been dragging a little bit more over the past few weeks  They report that this seems to happen when weather changes  She has not had any falls due to this  She has a Bioness stimulator at home which she has not been wearing  She denies any bowel or bladder changes  She denies any vision changes  She saw Dr Dionisio Cutler in September 2019 after prolonged absence from his office and everything looked stable    She denies any difficulty with speech or swallowing  Sleep and energy levels have been okay  She last had labs done in May 2019 with normal CBC and LFTs  The following portions of the patient's history were reviewed and updated as appropriate: current medications, past family history, past medical history, past social history, past surgical history and problem list          Objective:    Blood pressure 130/65, pulse 81, resp  rate 12, height 5' 10" (1 778 m), weight 73 5 kg (162 lb)    Physical Exam   Constitutional: She appears well-developed and well-nourished  HENT:   Head: Normocephalic and atraumatic  Eyes: Pupils are equal, round, and reactive to light  EOM are normal    Cardiovascular: Intact distal pulses  Neurological: She has normal strength and normal reflexes  Coordination normal    Skin: Skin is warm and dry  Psychiatric: She has a normal mood and affect  Her speech is normal        Neurological Exam  Mental Status   Oriented to person, place, time and situation  Speech is normal  Language is fluent with no aphasia  Attention and concentration are normal     Cranial Nerves  CN II: Visual fields full to confrontation  CN III, IV, VI: Extraocular movements intact bilaterally  Pupils equal round and reactive to light bilaterally  CN V: Facial sensation is normal   CN VII: Full and symmetric facial movement  CN VIII: Hearing is normal   CN IX, X: Palate elevates symmetrically  Normal gag reflex  CN XI: Shoulder shrug strength is normal   CN XII: Tongue midline without atrophy or fasciculations  Motor   Normal muscle tone  Strength is 5/5 throughout all four extremities  Sensory  Vibration abnormality: Decreased vibratory sensation in lower extremities bilaterally   Reflexes  Deep tendon reflexes are 2+ and symmetric in all four extremities with downgoing toes bilaterally      Coordination  Finger-to-nose, rapid alternating movements and heel-to-shin normal bilaterally without dysmetria  Gait  Casual gait is normal including stance, stride, and arm swing  Timed 25 foot walk = 6 05 seconds   ROS:    Review of Systems   Constitutional: Positive for fatigue  Negative for appetite change and fever  HENT: Negative  Negative for hearing loss, tinnitus, trouble swallowing and voice change  Eyes: Negative  Negative for photophobia and pain  Respiratory: Negative  Negative for shortness of breath  Cardiovascular: Negative  Negative for palpitations  Gastrointestinal: Negative  Negative for nausea and vomiting  Endocrine: Negative  Negative for cold intolerance and heat intolerance  Menstrual changes  Loss of sexual drive     Genitourinary: Positive for frequency and urgency  Negative for dysuria  Musculoskeletal: Negative  Negative for myalgias and neck pain  Skin: Negative  Negative for rash  Neurological: Negative  Negative for dizziness, tremors, seizures, syncope, facial asymmetry, speech difficulty, weakness, light-headedness, numbness and headaches  Memory problems  Clumsiness  Balance problems     Hematological: Negative  Does not bruise/bleed easily  Psychiatric/Behavioral: Positive for confusion and sleep disturbance  Negative for hallucinations          Depression   Mod swings       I personally reviewed and updated the ROS as appropriate

## 2019-11-05 NOTE — ASSESSMENT & PLAN NOTE
Currently on Rebif, remains overall stable  No new symptoms at this time  She reports some increased dragging of the right foot with increased fatigue, and also feels change in weather causes this to happen  This is a long-standing symptom and she has a Bioness stimulator that she has not been wearing  I suggested PT and wearing the Bioness again (PT could eval if this still fits and could do PT sessions with the stimulator), but patient and  would like to wait and see if it "calms down"  Again, she says this happens a lot as seasons change  PT order placed so it is in there if she decides to go  Last labs completed May 2019 with normal CBC and LFTs  Will update labs at this time  Last MRI brain 10/2017 stable compared to 2 years prior  Her 2015 exam was compared to 2002 and overall stable except for 1 new lesion   -discussed updating again next year    Exam is stable today

## 2019-11-05 NOTE — ASSESSMENT & PLAN NOTE
Patient tolerating Aricept 5mg  She had increased GI side effects at 10mg dosing  Will continue 5mg  Suggested keeping her mind active and busy with word searches, reading  She should continue to stay socially and physically active as well  Patient and  continue to implement compensatory strategies

## 2019-12-05 ENCOUNTER — HOSPITAL ENCOUNTER (EMERGENCY)
Facility: HOSPITAL | Age: 49
Discharge: HOME | End: 2019-12-05
Attending: EMERGENCY MEDICINE
Payer: COMMERCIAL

## 2019-12-05 ENCOUNTER — APPOINTMENT (EMERGENCY)
Dept: RADIOLOGY | Facility: HOSPITAL | Age: 49
End: 2019-12-05
Payer: COMMERCIAL

## 2019-12-05 VITALS
HEART RATE: 84 BPM | BODY MASS INDEX: 22.96 KG/M2 | RESPIRATION RATE: 18 BRPM | OXYGEN SATURATION: 100 % | WEIGHT: 160 LBS | SYSTOLIC BLOOD PRESSURE: 134 MMHG | TEMPERATURE: 99.3 F | DIASTOLIC BLOOD PRESSURE: 67 MMHG

## 2019-12-05 DIAGNOSIS — S93.401A RIGHT ANKLE SPRAIN: Primary | ICD-10-CM

## 2019-12-05 PROCEDURE — 99284 EMERGENCY DEPT VISIT MOD MDM: CPT | Performed by: PHYSICIAN ASSISTANT

## 2019-12-05 PROCEDURE — 96372 THER/PROPH/DIAG INJ SC/IM: CPT

## 2019-12-05 PROCEDURE — 99283 EMERGENCY DEPT VISIT LOW MDM: CPT

## 2019-12-05 PROCEDURE — 73610 X-RAY EXAM OF ANKLE: CPT

## 2019-12-05 PROCEDURE — 73630 X-RAY EXAM OF FOOT: CPT

## 2019-12-05 RX ORDER — KETOROLAC TROMETHAMINE 30 MG/ML
15 INJECTION, SOLUTION INTRAMUSCULAR; INTRAVENOUS ONCE
Status: COMPLETED | OUTPATIENT
Start: 2019-12-05 | End: 2019-12-05

## 2019-12-05 RX ADMIN — KETOROLAC TROMETHAMINE 15 MG: 30 INJECTION, SOLUTION INTRAMUSCULAR at 16:45

## 2019-12-05 NOTE — ED PROVIDER NOTES
History  Chief Complaint   Patient presents with    Fall     c/o right ankle pain/swelling s/p falling down 1-2 steps D/T "my MS"  Pt denies head injury, LOC, or c-spine tenderness with palpation  +NV intact     Patient is a 43-year-old female with hx of MS and drop foot, presenting to emergency department for evaluation of right ankle/foot injury  Pt states her drop foot has been controlled, but recently has been having a lot of stress which usually exacerbates her drop foot  Around noon today she was walking outside when she inverted her right ankle  Pt was able to ambulate after  Pt has not taken anything for her pain  Pt with prior injury to right ankle and foot  Patient denies knee pain, hip pain, head injury, loss consciousness, abrasion/laceration  Prior to Admission Medications   Prescriptions Last Dose Informant Patient Reported? Taking?    Ascorbic Acid (VITAMIN C) 100 MG tablet  Self Yes No   Sig: Take 100 mg by mouth daily   Cholecalciferol (VITAMIN D PO)  Self Yes No   Sig: Take by mouth   Cyanocobalamin (VITAMIN B-12 PO)  Self Yes No   Sig: Take by mouth   Omega-3 Fatty Acids (FISH OIL) 1200 MG CAPS  Self Yes No   Sig: Take by mouth   REBIF 44 MCG/0 5ML SOSY  Self No No   Sig: INJECT 44 MCG (0 5 ML) UNDER THE SKIN THREE TIMES A WEEK AS DIRECTED   bisacodyl (DULCOLAX) 5 mg EC tablet  Self No No   Sig: Take 2 tablets (10 mg total) by mouth once for 1 dose   Patient not taking: Reported on 11/5/2019   brompheniramine-pseudoephedrine-DM 30-2-10 MG/5ML syrup   No No   Sig: Take 10 mL by mouth 4 (four) times a day as needed for congestion or cough   Patient not taking: Reported on 11/5/2019   donepezil (ARICEPT) 5 mg tablet  Self No No   Sig: Take 1 tab po QHS   levonorgestrel-ethinyl estradiol (RAMIN-28) 0 15-30 MG-MCG per tablet  Self Yes No   Sig: Take by mouth   lubiprostone (AMITIZA) 24 mcg capsule  Self No No   Sig: Take 1 capsule (24 mcg total) by mouth 2 (two) times a day with meals nitrofurantoin (MACROBID) 100 mg capsule  Self No No   Sig: Take 1 capsule (100 mg total) by mouth daily as needed (one tablet as needed after intercourse)      Facility-Administered Medications: None       Past Medical History:   Diagnosis Date    Multiple sclerosis ([address] )     Neurogenic bladder        Past Surgical History:   Procedure Laterality Date    AR COLONOSCOPY FLX DX W/COLLJ SPEC WHEN PFRMD N/A 6/8/2018    Procedure: COLONOSCOPY;  Surgeon: Cynthia Queen MD;  [facility]; Service: Gastroenterology    WISDOM TOOTH EXTRACTION         Family History   Problem Relation Age of Onset    Addiction problem Father         alcohol    Liver disease Father     Heart disease Father     Hypertension Father     Colon cancer Maternal Aunt     Diabetes Maternal Aunt      I have reviewed and agree with the history as documented  Social History     Tobacco Use    Smoking status: Never Smoker    Smokeless tobacco: Never Used   Substance Use Topics    Alcohol use: Yes     Comment: occassionally    Drug use: No        Review of Systems   Skin:        Ankle/foot pain   All other systems reviewed and are negative  Physical Exam  Physical Exam   Constitutional: She is oriented to person, place, and time  She appears well-developed and well-nourished  HENT:   Head: Normocephalic and atraumatic  Nose: Nose normal    Eyes: Conjunctivae are normal    Neck: Normal range of motion  Cardiovascular: Normal rate, regular rhythm and intact distal pulses  Pulmonary/Chest: Effort normal and breath sounds normal    Neurological: She is alert and oriented to person, place, and time  Skin: Skin is warm and dry  Psychiatric: She has a normal mood and affect   Her behavior is normal        Vital Signs  ED Triage Vitals [12/05/19 1605]   Temperature Pulse Respirations Blood Pressure SpO2   99 3 °F (37 4 °C) 84 18 134/67 100 %      Temp Source Heart Rate Source Patient Position - Orthostatic VS BP Location FiO2 (%)   Oral Monitor Lying Right arm --      Pain Score       9           Vitals:    12/05/19 1605   BP: 134/67   Pulse: 84   Patient Position - Orthostatic VS: Lying         Visual Acuity  Visual Acuity      Most Recent Value   L Pupil Size (mm)  4   R Pupil Size (mm)  4          ED Medications  Medications   ketorolac (TORADOL) injection 15 mg (15 mg Intramuscular Given 12/5/19 1645)       Diagnostic Studies  Results Reviewed     None                 XR ankle 3+ views RIGHT   ED Interpretation by Josep Newman PA-C (12/05 1741)   No acute osseous abnormality seen by me      Final Result by Mariusz Keller MD (12/05 1754)      No acute osseous abnormality  Workstation performed: [id number]         XR foot 3+ views RIGHT   ED Interpretation by Josep Newman PA-C (12/05 1741)   No acute osseous abnormality seen by me      Final Result by Mariusz Keller MD (12/05 1754)      No acute osseous abnormality  Workstation performed: [id number]                    Procedures  Procedures         ED Course                               MDM  Number of Diagnoses or Management Options  Right ankle sprain:   Diagnosis management comments: Patient is a 14-year-old female with hx of MS and drop foot, presenting to emergency department for evaluation of right ankle/foot injury  Pt states her drop foot has been controlled, but recently has been having a lot of stress which usually exacerbates her drop foot  Around noon today she was walking outside when she inverted her right ankle  Pt was able to ambulate after  X-ray right ankle and foot show no acute osseous abnormality seen by me, however the film will be reviewed by a radiologist   I informed the patient of this and if there is any discrepancy, the patient will be contacted  Pt placed in aircast and provided crutches  Information for orthopedic given the patient and advised if symptoms do not improve to follow up    Ankle exercises were provided to patient  Recommend rest, ice, compression and elevation of ankle  Pt verbalizes understanding and agrees with plan  The management plan was discussed in detail with the patient at bedside and all questions were answered  Prior to discharge, I provided both verbal and written instructions  I discussed with the patient the signs and symptoms for which to return to the emergency department  All questions were answered and patient was comfortable with the plan of care and discharged to home  The patient verbalized understanding of our discussion and plan of care, and agrees to return to the Emergency Department for concerns and progression of illness  Disposition  Final diagnoses:   Right ankle sprain     Time reflects when diagnosis was documented in both MDM as applicable and the Disposition within this note     Time User Action Codes Description Comment    12/5/2019  5:41 PM Jordyn Almeida Add [B41 [id number]] Right ankle sprain       ED Disposition     ED Disposition Condition Date/Time Comment    Discharge Stable Thu Dec 5, 2019  5:41 PM Sridevi Wong discharge to home/self care              Follow-up Information     Follow up With Specialties Details Why Contact Info Additional Information     [facility]   [address] [phone]  [address] [place] [address] [facility]ists [facility], [facility], [place] [address], [address] [place], [place], [address] [place] [address]          Discharge Medication List as of 12/5/2019  5:41 PM      CONTINUE these medications which have NOT CHANGED    Details   Ascorbic Acid (VITAMIN C) 100 MG tablet Take 100 mg by mouth daily, Historical Med      bisacodyl (DULCOLAX) 5 mg EC tablet Take 2 tablets (10 mg total) by mouth once for 1 dose, Starting u 5/3/2018, Normal      brompheniramine-pseudoephedrine-DM 30-2-10 MG/5ML syrup Take 10 mL by mouth 4 (four) times a day as needed for congestion or cough, Starting Mon 9/16/2019, Normal      Cholecalciferol (VITAMIN D PO) Take by mouth, Historical Med      Cyanocobalamin (VITAMIN B-12 PO) Take by mouth, Historical Med      donepezil (ARICEPT) 5 mg tablet Take 1 tab po QHS, Normal      levonorgestrel-ethinyl estradiol (RAMIN-28) 0 15-30 MG-MCG per tablet Take by mouth, Historical Med      lubiprostone (AMITIZA) 24 mcg capsule Take 1 capsule (24 mcg total) by mouth 2 (two) times a day with meals, Starting u 5/3/2018, Normal      nitrofurantoin (MACROBID) 100 mg capsule Take 1 capsule (100 mg total) by mouth daily as needed (one tablet as needed after intercourse), Starting Thu 4/18/2019, Normal      Omega-3 Fatty Acids (FISH OIL) 1200 MG CAPS Take by mouth, Historical Med      REBIF 44 MCG/0 5ML SOSY INJECT 44 MCG (0 5 ML) UNDER THE SKIN THREE TIMES A WEEK AS DIRECTED, Normal           No discharge procedures on file      ED Provider  Electronically Signed by           Daniel Julian PA-C  12/05/19 0725

## 2020-01-02 ENCOUNTER — OFFICE VISIT (OUTPATIENT)
Dept: FAMILY MEDICINE CLINIC | Facility: CLINIC | Age: 50
End: 2020-01-02
Payer: COMMERCIAL

## 2020-01-02 VITALS
HEIGHT: 70 IN | TEMPERATURE: 98.4 F | SYSTOLIC BLOOD PRESSURE: 112 MMHG | DIASTOLIC BLOOD PRESSURE: 78 MMHG | WEIGHT: 161.5 LBS | BODY MASS INDEX: 23.12 KG/M2 | HEART RATE: 80 BPM

## 2020-01-02 DIAGNOSIS — J20.9 ACUTE BRONCHITIS, UNSPECIFIED ORGANISM: Primary | ICD-10-CM

## 2020-01-02 PROCEDURE — 99213 OFFICE O/P EST LOW 20 MIN: CPT | Performed by: FAMILY MEDICINE

## 2020-01-02 PROCEDURE — 1036F TOBACCO NON-USER: CPT | Performed by: FAMILY MEDICINE

## 2020-01-02 PROCEDURE — 3008F BODY MASS INDEX DOCD: CPT | Performed by: FAMILY MEDICINE

## 2020-01-02 RX ORDER — DOXYCYCLINE HYCLATE 100 MG/1
100 CAPSULE ORAL EVERY 12 HOURS SCHEDULED
Qty: 20 CAPSULE | Refills: 0 | Status: SHIPPED | OUTPATIENT
Start: 2020-01-02 | End: 2020-01-12

## 2020-01-02 RX ORDER — BROMPHENIRAMINE MALEATE, PSEUDOEPHEDRINE HYDROCHLORIDE, AND DEXTROMETHORPHAN HYDROBROMIDE 2; 30; 10 MG/5ML; MG/5ML; MG/5ML
10 SYRUP ORAL 4 TIMES DAILY PRN
Qty: 180 ML | Refills: 0 | Status: SHIPPED | OUTPATIENT
Start: 2020-01-02 | End: 2020-01-06 | Stop reason: SDUPTHER

## 2020-01-02 RX ORDER — PREDNISONE 10 MG/1
TABLET ORAL
Qty: 26 TABLET | Refills: 0 | Status: SHIPPED | OUTPATIENT
Start: 2020-01-02 | End: 2022-03-28 | Stop reason: ALTCHOICE

## 2020-01-02 NOTE — PROGRESS NOTES
Patient ID: Marco Montes is a 47 y o  female  HPI: 47 y  o female presenting with symptoms of chest congestion, cough and wheezing        SUBJECTIVE    Family History   Problem Relation Age of Onset    Addiction problem Father         alcohol    Liver disease Father     Heart disease Father     Hypertension Father     Colon cancer Maternal Aunt     Diabetes Maternal Aunt      Social History     Socioeconomic History    Marital status: /Civil Union     Spouse name: Not on file    Number of children: Not on file    Years of education: Not on file    Highest education level: Not on file   Occupational History    Not on file   Social Needs    Financial resource strain: Not on file    Food insecurity:     Worry: Not on file     Inability: Not on file    Transportation needs:     Medical: Not on file     Non-medical: Not on file   Tobacco Use    Smoking status: Never Smoker    Smokeless tobacco: Never Used   Substance and Sexual Activity    Alcohol use: Yes     Comment: occassionally    Drug use: No    Sexual activity: Not on file   Lifestyle    Physical activity:     Days per week: Not on file     Minutes per session: Not on file    Stress: Not on file   Relationships    Social connections:     Talks on phone: Not on file     Gets together: Not on file     Attends Mandaeism service: Not on file     Active member of club or organization: Not on file     Attends meetings of clubs or organizations: Not on file     Relationship status: Not on file    Intimate partner violence:     Fear of current or ex partner: Not on file     Emotionally abused: Not on file     Physically abused: Not on file     Forced sexual activity: Not on file   Other Topics Concern    Not on file   Social History Narrative    Caffeine use    Currently      Past Medical History:   Diagnosis Date    Multiple sclerosis ([facility] [address] )     Neurogenic bladder      Past Surgical History:   Procedure Laterality Date    AR COLONOSCOPY FLX DX W/COLLJ SPEC WHEN PFRMD N/A 6/8/2018    Procedure: COLONOSCOPY;  Surgeon: Jonathan Pink MD;  [facility];   Service: Gastroenterology    WISDOM TOOTH EXTRACTION       Allergies   Allergen Reactions    Clindamycin     Penicillins        Current Outpatient Medications:     Ascorbic Acid (VITAMIN C) 100 MG tablet, Take 100 mg by mouth daily, Disp: , Rfl:     Cholecalciferol (VITAMIN D PO), Take by mouth, Disp: , Rfl:     Cyanocobalamin (VITAMIN B-12 PO), Take by mouth, Disp: , Rfl:     donepezil (ARICEPT) 5 mg tablet, Take 1 tab po QHS, Disp: 90 tablet, Rfl: 3    levonorgestrel-ethinyl estradiol (RAMIN-28) 0 15-30 MG-MCG per tablet, Take by mouth, Disp: , Rfl:     lubiprostone (AMITIZA) 24 mcg capsule, Take 1 capsule (24 mcg total) by mouth 2 (two) times a day with meals, Disp: 60 capsule, Rfl: 6    nitrofurantoin (MACROBID) 100 mg capsule, Take 1 capsule (100 mg total) by mouth daily as needed (one tablet as needed after intercourse), Disp: 90 capsule, Rfl: 3    Omega-3 Fatty Acids (FISH OIL) 1200 MG CAPS, Take by mouth, Disp: , Rfl:     REBIF 44 MCG/0 5ML SOSY, INJECT 44 MCG (0 5 ML) UNDER THE SKIN THREE TIMES A WEEK AS DIRECTED, Disp: 6 mL, Rfl: 9    brompheniramine-pseudoephedrine-DM 30-2-10 MG/5ML syrup, Take 10 mL by mouth 4 (four) times a day as needed for congestion or cough, Disp: 180 mL, Rfl: 0    doxycycline hyclate (VIBRAMYCIN) 100 mg capsule, Take 1 capsule (100 mg total) by mouth every 12 (twelve) hours for 10 days, Disp: 20 capsule, Rfl: 0    predniSONE 10 mg tablet, 3 tabs po bid x2 days, then 2 tabs po bid x2 days, then 1 tab bid x2 days, then 1 daily until done , Disp: 26 tablet, Rfl: 0    Review of Systems    Constitutional:     Denies fever, chills, fatigue, weakness ,weight loss, weight gain     ENT: Denies earache, loss of hearing, nosebleed, nasal discharge,nasal congestion, sore throat,hoarseness  Pulmonary: Denies shortness of breath , dyspnea on exertion, orthopnea ,but complains of cough, wheezing and pnd  Cardiovascular:  Denies bradycardia , tachycardia ,palpations, lower extremity, edema leg, claudication  Breast:  Denies new or changing breast lumps,  nipple discharge, nipple changes,  Abdomen:  Denies abdominal pain , anorexia ,indigestion, nausea ,vomiting, constipation , diarrhea  Musculoskeletal: Denies myalgias, arthralgias, joint swelling, joint stiffness ,limb pain, limb swelling  Lymph: denies swollen glands  Gu: no dysuria or urinary frequency  Skin: Denies skin rash, skin lesion, skin wound, itching,dry skin  Neuro: Denies headache, numbness, tingling, confusion, loss of consciousness, dizziness ,vertigo  Psychiatric: Denies feelings of depression, suicidal ideation, anxiety, sleep disturbances    OBJECTIVE  /78   Pulse 80   Temp 98 4 °F (36 9 °C)   Ht 5' 10" (1 778 m)   Wt 73 3 kg (161 lb 8 oz)   BMI 23 17 kg/m²   Constitutional:   NAD, well appearing and well nourished      ENT:   Conjunctiva and lids: no injection, edema, or discharge     Pupils and iris: MARGARITA bilaterally    External inspection of ears and nose: normal without deformities or discharge  Otoscopic exam: Canals patent without erythema; tm are dull and erythematous bilaterally       Nasal mucosa, septum and turbinates: Turbinae injection with discharge   Oropharynx:  Moist mucosa, normal tongue and tonsils without lesions  Erythema and injection  of post pharynx with pnd     Pulmonary:Respiratory effort normal rate and rhythm, no increased work of breathing   Auscultation of lungs:  Scattered rhonchi and expiratory wheezes bilaterally      Cardiovascular: regular rate and rhythm, S1 and S2, no murmur, no edema and/or varicosities of LE      Abdomen: Soft and non-distended    Positive bowel sounds    No heptomegaly or splenomegaly    Gu: no suprapubic tenderness, no CVA tenderness, or urethral discharge  Lymphatic: Anterior cervical lymphadenopathy Muscskeletal:  Gait and station: Normal gait     Digits and nails normal without clubbing or cyanosis      Inspection/palpation of joints, bones, and muscles:  No joint tenderness, swelling, full active and passive range of motion  Skin: Normal skin turgor and no rashes      Neuro:    Normal reflexes       Psych:   alert and oriented to person, place and time     normal mood and affect       Assessment/Plan:Diagnoses and all orders for this visit:    Acute bronchitis, unspecified organism  -     predniSONE 10 mg tablet; 3 tabs po bid x2 days, then 2 tabs po bid x2 days, then 1 tab bid x2 days, then 1 daily until done  -     brompheniramine-pseudoephedrine-DM 30-2-10 MG/5ML syrup; Take 10 mL by mouth 4 (four) times a day as needed for congestion or cough  -     doxycycline hyclate (VIBRAMYCIN) 100 mg capsule; Take 1 capsule (100 mg total) by mouth every 12 (twelve) hours for 10 days        Reviewed with patient plan to treat with above plan      Patient instructed to call in 72 hours if not feeling better or if symptoms worsen

## 2020-01-06 ENCOUNTER — TELEPHONE (OUTPATIENT)
Dept: FAMILY MEDICINE CLINIC | Facility: CLINIC | Age: 50
End: 2020-01-06

## 2020-01-06 DIAGNOSIS — J20.9 ACUTE BRONCHITIS, UNSPECIFIED ORGANISM: ICD-10-CM

## 2020-01-06 RX ORDER — BROMPHENIRAMINE MALEATE, PSEUDOEPHEDRINE HYDROCHLORIDE, AND DEXTROMETHORPHAN HYDROBROMIDE 2; 30; 10 MG/5ML; MG/5ML; MG/5ML
10 SYRUP ORAL 4 TIMES DAILY PRN
Qty: 180 ML | Refills: 0 | Status: SHIPPED | OUTPATIENT
Start: 2020-01-06 | End: 2022-03-28 | Stop reason: ALTCHOICE

## 2020-01-06 NOTE — TELEPHONE ENCOUNTER
Patients  called stating she still hasn't completely gotten rid of the cough and doesn't want her MS to start acting up because of it  They are asking for another round of medication to be called in? Brompheniramine-Pseudoephedrine  [facility]  Aware to check with pharmacy       Aware Hipolito Roberts is out of office but is asking if someone could call something in

## 2020-02-19 DIAGNOSIS — G35 MULTIPLE SCLEROSIS (HCC): ICD-10-CM

## 2020-02-19 NOTE — TELEPHONE ENCOUNTER
[address] faxed med refill request for pt Rebif 44 mcg prefilled syringe  Pt inject 44 mcg (0 5 ml) under the skin three times a week as directed  Pt last ov 11/5/19 nect f/u 5/11/2020

## 2020-02-20 ENCOUNTER — TELEPHONE (OUTPATIENT)
Dept: NEUROLOGY | Facility: CLINIC | Age: 50
End: 2020-02-20
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
## 2020-02-20 NOTE — TELEPHONE ENCOUNTER
Please let patient know I refilled her Rebif, however she is overdue for labs  I ordered some at the visit in November, and I do not see they have been completed  Please have her get these labs done asap  Thanks!

## 2020-03-02 ENCOUNTER — TRANSCRIBE ORDERS (OUTPATIENT)
Dept: LAB | Facility: CLINIC | Age: 50
End: 2020-03-02

## 2020-03-02 ENCOUNTER — APPOINTMENT (OUTPATIENT)
Dept: LAB | Facility: CLINIC | Age: 50
End: 2020-03-02
Payer: COMMERCIAL

## 2020-03-02 DIAGNOSIS — G35 MULTIPLE SCLEROSIS (HCC): ICD-10-CM

## 2020-03-02 LAB
ALBUMIN SERPL BCP-MCNC: 3.7 G/DL (ref 3.5–5)
ALP SERPL-CCNC: 69 U/L (ref 46–116)
ALT SERPL W P-5'-P-CCNC: 28 U/L (ref 12–78)
ANION GAP SERPL CALCULATED.3IONS-SCNC: 3 MMOL/L (ref 4–13)
AST SERPL W P-5'-P-CCNC: 20 U/L (ref 5–45)
BASOPHILS # BLD AUTO: 0.02 THOUSANDS/ΜL (ref 0–0.1)
BASOPHILS NFR BLD AUTO: 0 % (ref 0–1)
BILIRUB SERPL-MCNC: 0.48 MG/DL (ref 0.2–1)
BUN SERPL-MCNC: 17 MG/DL (ref 5–25)
CALCIUM SERPL-MCNC: 9.1 MG/DL (ref 8.3–10.1)
CHLORIDE SERPL-SCNC: 110 MMOL/L (ref 100–108)
CO2 SERPL-SCNC: 28 MMOL/L (ref 21–32)
CREAT SERPL-MCNC: 0.85 MG/DL (ref 0.6–1.3)
EOSINOPHIL # BLD AUTO: 0.05 THOUSAND/ΜL (ref 0–0.61)
EOSINOPHIL NFR BLD AUTO: 1 % (ref 0–6)
ERYTHROCYTE [DISTWIDTH] IN BLOOD BY AUTOMATED COUNT: 13.1 % (ref 11.6–15.1)
GFR SERPL CREATININE-BSD FRML MDRD: 80 ML/MIN/1.73SQ M
GLUCOSE P FAST SERPL-MCNC: 91 MG/DL (ref 65–99)
HCT VFR BLD AUTO: 45 % (ref 34.8–46.1)
HGB BLD-MCNC: 14.5 G/DL (ref 11.5–15.4)
IMM GRANULOCYTES # BLD AUTO: 0.02 THOUSAND/UL (ref 0–0.2)
IMM GRANULOCYTES NFR BLD AUTO: 0 % (ref 0–2)
LYMPHOCYTES # BLD AUTO: 1.89 THOUSANDS/ΜL (ref 0.6–4.47)
LYMPHOCYTES NFR BLD AUTO: 28 % (ref 14–44)
MCH RBC QN AUTO: 30.4 PG (ref 26.8–34.3)
MCHC RBC AUTO-ENTMCNC: 32.2 G/DL (ref 31.4–37.4)
MCV RBC AUTO: 94 FL (ref 82–98)
MONOCYTES # BLD AUTO: 0.39 THOUSAND/ΜL (ref 0.17–1.22)
MONOCYTES NFR BLD AUTO: 6 % (ref 4–12)
NEUTROPHILS # BLD AUTO: 4.46 THOUSANDS/ΜL (ref 1.85–7.62)
NEUTS SEG NFR BLD AUTO: 65 % (ref 43–75)
NRBC BLD AUTO-RTO: 0 /100 WBCS
PLATELET # BLD AUTO: 293 THOUSANDS/UL (ref 149–390)
PMV BLD AUTO: 10.2 FL (ref 8.9–12.7)
POTASSIUM SERPL-SCNC: 4.1 MMOL/L (ref 3.5–5.3)
PROT SERPL-MCNC: 7.6 G/DL (ref 6.4–8.2)
RBC # BLD AUTO: 4.77 MILLION/UL (ref 3.81–5.12)
SODIUM SERPL-SCNC: 141 MMOL/L (ref 136–145)
WBC # BLD AUTO: 6.83 THOUSAND/UL (ref 4.31–10.16)

## 2020-03-02 PROCEDURE — 85025 COMPLETE CBC W/AUTO DIFF WBC: CPT

## 2020-03-02 PROCEDURE — 80053 COMPREHEN METABOLIC PANEL: CPT

## 2020-03-02 PROCEDURE — 36415 COLL VENOUS BLD VENIPUNCTURE: CPT

## 2020-03-24 ENCOUNTER — TELEPHONE (OUTPATIENT)
Dept: NEUROLOGY | Facility: CLINIC | Age: 50
End: 2020-03-24

## 2020-03-24 NOTE — TELEPHONE ENCOUNTER
Received Disability papers by mail from the patient  These are [place] forms that need to be filled out  These are forms that the Dr fills out every year  Lmom for pt to call back and make $15 payment so forms can be processed  Charges are dropped

## 2020-04-22 DIAGNOSIS — G31.84 MILD COGNITIVE IMPAIRMENT: ICD-10-CM

## 2020-04-22 RX ORDER — DONEPEZIL HYDROCHLORIDE 5 MG/1
TABLET, FILM COATED ORAL
Qty: 90 TABLET | Refills: 3 | Status: SHIPPED | OUTPATIENT
Start: 2020-04-22 | End: 2021-04-17

## 2020-04-24 ENCOUNTER — TELEMEDICINE (OUTPATIENT)
Dept: UROLOGY | Facility: AMBULATORY SURGERY CENTER | Age: 50
End: 2020-04-24
Payer: COMMERCIAL

## 2020-04-24 DIAGNOSIS — N31.9 NEUROGENIC BLADDER: Primary | ICD-10-CM

## 2020-04-24 PROCEDURE — 99441 PR PHYS/QHP TELEPHONE EVALUATION 5-10 MIN: CPT | Performed by: NURSE PRACTITIONER

## 2020-04-24 RX ORDER — NITROFURANTOIN 25; 75 MG/1; MG/1
100 CAPSULE ORAL DAILY PRN
Qty: 90 CAPSULE | Refills: 3 | Status: SHIPPED | OUTPATIENT
Start: 2020-04-24

## 2020-05-01 ENCOUNTER — TELEPHONE (OUTPATIENT)
Dept: NEUROLOGY | Facility: CLINIC | Age: 50
End: 2020-05-01

## 2020-05-19 DIAGNOSIS — G35 MULTIPLE SCLEROSIS (HCC): ICD-10-CM

## 2020-05-19 RX ORDER — INTERFERON BETA-1A 44 UG/.5ML
INJECTION, SOLUTION SUBCUTANEOUS
Qty: 6 ML | Refills: 12 | Status: SHIPPED | OUTPATIENT
Start: 2020-05-19 | End: 2021-05-18

## 2020-09-01 ENCOUNTER — TELEPHONE (OUTPATIENT)
Dept: FAMILY MEDICINE CLINIC | Facility: CLINIC | Age: 50
End: 2020-09-01

## 2020-09-01 NOTE — TELEPHONE ENCOUNTER
She should really #1- check with her neurologist first  #2- if they say yes, she needs to check with her insurance  Some plans do not cover it until age of 60 yrs

## 2020-09-08 ENCOUNTER — TELEPHONE (OUTPATIENT)
Dept: NEUROLOGY | Facility: CLINIC | Age: 50
End: 2020-09-08

## 2020-09-08 NOTE — TELEPHONE ENCOUNTER
Left message for patient to call the office regarding the appointment scheduled on 11/23/20 with Dr Sona Lima  Stated she will be out of the office that day, and we need to change the appointment  I did mention that there is availability at this time with Prashant Crane    Please discuss and reschedule

## 2020-10-06 ENCOUNTER — IMMUNIZATIONS (OUTPATIENT)
Dept: FAMILY MEDICINE CLINIC | Facility: CLINIC | Age: 50
End: 2020-10-06
Payer: COMMERCIAL

## 2020-10-06 DIAGNOSIS — Z23 ENCOUNTER FOR IMMUNIZATION: ICD-10-CM

## 2020-10-06 PROCEDURE — 90471 IMMUNIZATION ADMIN: CPT | Performed by: FAMILY MEDICINE

## 2020-10-06 PROCEDURE — 90682 RIV4 VACC RECOMBINANT DNA IM: CPT | Performed by: FAMILY MEDICINE

## 2020-11-02 ENCOUNTER — OFFICE VISIT (OUTPATIENT)
Dept: NEUROLOGY | Facility: CLINIC | Age: 50
End: 2020-11-02
Payer: COMMERCIAL

## 2020-11-02 VITALS
WEIGHT: 169.8 LBS | BODY MASS INDEX: 24.36 KG/M2 | TEMPERATURE: 96.9 F | HEART RATE: 86 BPM | SYSTOLIC BLOOD PRESSURE: 141 MMHG | DIASTOLIC BLOOD PRESSURE: 73 MMHG

## 2020-11-02 DIAGNOSIS — G31.84 MILD COGNITIVE IMPAIRMENT: ICD-10-CM

## 2020-11-02 DIAGNOSIS — G35 MULTIPLE SCLEROSIS (HCC): Primary | ICD-10-CM

## 2020-11-02 PROCEDURE — 99214 OFFICE O/P EST MOD 30 MIN: CPT | Performed by: PHYSICIAN ASSISTANT

## 2020-11-10 ENCOUNTER — LAB (OUTPATIENT)
Dept: LAB | Facility: CLINIC | Age: 50
End: 2020-11-10
Payer: COMMERCIAL

## 2020-11-10 DIAGNOSIS — G35 MULTIPLE SCLEROSIS (HCC): ICD-10-CM

## 2020-11-10 LAB
ALBUMIN SERPL BCP-MCNC: 3.6 G/DL (ref 3.5–5)
ALP SERPL-CCNC: 71 U/L (ref 46–116)
ALT SERPL W P-5'-P-CCNC: 31 U/L (ref 12–78)
AST SERPL W P-5'-P-CCNC: 21 U/L (ref 5–45)
BASOPHILS # BLD AUTO: 0.03 THOUSANDS/ΜL (ref 0–0.1)
BASOPHILS NFR BLD AUTO: 1 % (ref 0–1)
BILIRUB DIRECT SERPL-MCNC: 0.11 MG/DL (ref 0–0.2)
BILIRUB SERPL-MCNC: 0.48 MG/DL (ref 0.2–1)
EOSINOPHIL # BLD AUTO: 0.04 THOUSAND/ΜL (ref 0–0.61)
EOSINOPHIL NFR BLD AUTO: 1 % (ref 0–6)
ERYTHROCYTE [DISTWIDTH] IN BLOOD BY AUTOMATED COUNT: 13.1 % (ref 11.6–15.1)
HCT VFR BLD AUTO: 45.1 % (ref 34.8–46.1)
HGB BLD-MCNC: 14.4 G/DL (ref 11.5–15.4)
IMM GRANULOCYTES # BLD AUTO: 0.01 THOUSAND/UL (ref 0–0.2)
IMM GRANULOCYTES NFR BLD AUTO: 0 % (ref 0–2)
LYMPHOCYTES # BLD AUTO: 1.85 THOUSANDS/ΜL (ref 0.6–4.47)
LYMPHOCYTES NFR BLD AUTO: 30 % (ref 14–44)
MCH RBC QN AUTO: 30.6 PG (ref 26.8–34.3)
MCHC RBC AUTO-ENTMCNC: 31.9 G/DL (ref 31.4–37.4)
MCV RBC AUTO: 96 FL (ref 82–98)
MONOCYTES # BLD AUTO: 0.62 THOUSAND/ΜL (ref 0.17–1.22)
MONOCYTES NFR BLD AUTO: 10 % (ref 4–12)
NEUTROPHILS # BLD AUTO: 3.64 THOUSANDS/ΜL (ref 1.85–7.62)
NEUTS SEG NFR BLD AUTO: 58 % (ref 43–75)
NRBC BLD AUTO-RTO: 0 /100 WBCS
PLATELET # BLD AUTO: 243 THOUSANDS/UL (ref 149–390)
PMV BLD AUTO: 10.7 FL (ref 8.9–12.7)
PROT SERPL-MCNC: 7.5 G/DL (ref 6.4–8.2)
RBC # BLD AUTO: 4.71 MILLION/UL (ref 3.81–5.12)
WBC # BLD AUTO: 6.19 THOUSAND/UL (ref 4.31–10.16)

## 2020-11-10 PROCEDURE — 36415 COLL VENOUS BLD VENIPUNCTURE: CPT

## 2020-11-10 PROCEDURE — 80076 HEPATIC FUNCTION PANEL: CPT

## 2020-11-10 PROCEDURE — 85025 COMPLETE CBC W/AUTO DIFF WBC: CPT

## 2021-02-09 ENCOUNTER — TELEPHONE (OUTPATIENT)
Dept: NEUROLOGY | Facility: CLINIC | Age: 51
End: 2021-02-09

## 2021-02-09 NOTE — TELEPHONE ENCOUNTER
Patient's  calling regarding Covid vaccine for patient  Advised of below: While the Covid-19 vaccine was not specifically studied in MS patients, it is probably safe for patients with MS, off or on disease modifying therapy, 25years of age and older  The benefits  of the Covid-19 vaccine outweigh the risks in this evolving pandemic  Please follow the link below to the [employer]'s statement on the vaccine in MS patients:  Chrissy harrison    Patient MyChart not activated  Advised on how to activate

## 2021-03-10 DIAGNOSIS — Z23 ENCOUNTER FOR IMMUNIZATION: ICD-10-CM

## 2021-04-14 ENCOUNTER — TELEPHONE (OUTPATIENT)
Dept: FAMILY MEDICINE CLINIC | Facility: CLINIC | Age: 51
End: 2021-04-14

## 2021-04-14 DIAGNOSIS — B00.9 HSV INFECTION: Primary | ICD-10-CM

## 2021-04-14 RX ORDER — VALACYCLOVIR HYDROCHLORIDE 1 G/1
1000 TABLET, FILM COATED ORAL DAILY
Qty: 30 TABLET | Refills: 0 | Status: SHIPPED | OUTPATIENT
Start: 2021-04-14 | End: 2021-05-11 | Stop reason: SDUPTHER

## 2021-04-14 NOTE — TELEPHONE ENCOUNTER
She gets sun blisters on her lips, she had a break out but its now going away, she uses Abreva for the break out,  Someone told her to use Acyclovir to prevent them, can you prescribe this for her?  emilee gardner    She will ck with pharm if no cb

## 2021-04-14 NOTE — TELEPHONE ENCOUNTER
The only preventative is a capsule she would take one daily to block these from occurring  The cream is only used if  She feels like she is getting one, to block it from coming out; not to use daily as a preventative

## 2021-04-17 DIAGNOSIS — G31.84 MILD COGNITIVE IMPAIRMENT: ICD-10-CM

## 2021-04-17 RX ORDER — DONEPEZIL HYDROCHLORIDE 5 MG/1
TABLET, FILM COATED ORAL
Qty: 90 TABLET | Refills: 3 | Status: SHIPPED | OUTPATIENT
Start: 2021-04-17 | End: 2022-04-12

## 2021-05-10 ENCOUNTER — OFFICE VISIT (OUTPATIENT)
Dept: NEUROLOGY | Facility: CLINIC | Age: 51
End: 2021-05-10
Payer: COMMERCIAL

## 2021-05-10 ENCOUNTER — TELEPHONE (OUTPATIENT)
Dept: NEUROLOGY | Facility: CLINIC | Age: 51
End: 2021-05-10

## 2021-05-10 VITALS
DIASTOLIC BLOOD PRESSURE: 60 MMHG | BODY MASS INDEX: 24.25 KG/M2 | WEIGHT: 169 LBS | HEART RATE: 104 BPM | SYSTOLIC BLOOD PRESSURE: 108 MMHG

## 2021-05-10 DIAGNOSIS — R41.1 ANTEROGRADE AMNESIA: ICD-10-CM

## 2021-05-10 DIAGNOSIS — N31.9 NEUROGENIC BLADDER: ICD-10-CM

## 2021-05-10 DIAGNOSIS — G31.84 MILD COGNITIVE IMPAIRMENT: Primary | ICD-10-CM

## 2021-05-10 DIAGNOSIS — G60.9 HEREDITARY AND IDIOPATHIC NEUROPATHY, UNSPECIFIED: ICD-10-CM

## 2021-05-10 PROCEDURE — 99214 OFFICE O/P EST MOD 30 MIN: CPT | Performed by: PSYCHIATRY & NEUROLOGY
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
## 2021-05-10 NOTE — TELEPHONE ENCOUNTER
Patient paid fee of $15 00 to [facility] at check out  Forms were scanned into chart and routed to [place] be completed

## 2021-05-10 NOTE — TELEPHONE ENCOUNTER
At visit with Dr Phoebe Larry today, patient presented with forms for Dr Phoebe Larry to complete [facility] Attending Physician's Statement form  $49 charge dropped  Will need to obtain payment at Check out today
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
## 2021-05-11 ENCOUNTER — TELEPHONE (OUTPATIENT)
Dept: NEUROLOGY | Facility: CLINIC | Age: 51
End: 2021-05-11

## 2021-05-11 DIAGNOSIS — B00.9 HSV INFECTION: ICD-10-CM

## 2021-05-11 RX ORDER — VALACYCLOVIR HYDROCHLORIDE 1 G/1
1000 TABLET, FILM COATED ORAL DAILY
Qty: 30 TABLET | Refills: 0 | Status: SHIPPED | OUTPATIENT
Start: 2021-05-11 | End: 2022-04-26 | Stop reason: SDUPTHER

## 2021-05-11 NOTE — TELEPHONE ENCOUNTER
Received MRO Request from The [address] [place] [address] requesting all records for the period from 5/1/2020 to present  Scanned document into chart and faxed to [id number] on 5/11/21

## 2021-05-18 DIAGNOSIS — G35 MULTIPLE SCLEROSIS (HCC): ICD-10-CM

## 2021-05-18 RX ORDER — INTERFERON BETA-1A 44 UG/.5ML
INJECTION, SOLUTION SUBCUTANEOUS
Qty: 12 ML | Refills: 5 | Status: SHIPPED | OUTPATIENT
Start: 2021-05-18 | End: 2021-11-01

## 2021-05-26 ENCOUNTER — APPOINTMENT (OUTPATIENT)
Dept: LAB | Facility: CLINIC | Age: 51
End: 2021-05-26
Payer: COMMERCIAL

## 2021-05-26 DIAGNOSIS — G60.9 HEREDITARY AND IDIOPATHIC NEUROPATHY, UNSPECIFIED: ICD-10-CM

## 2021-05-26 DIAGNOSIS — G31.84 MILD COGNITIVE IMPAIRMENT: ICD-10-CM

## 2021-05-26 DIAGNOSIS — R41.1 ANTEROGRADE AMNESIA: ICD-10-CM

## 2021-05-26 LAB
ALBUMIN SERPL BCP-MCNC: 3.4 G/DL (ref 3.5–5)
ALP SERPL-CCNC: 68 U/L (ref 46–116)
ALT SERPL W P-5'-P-CCNC: 31 U/L (ref 12–78)
AST SERPL W P-5'-P-CCNC: 23 U/L (ref 5–45)
BASOPHILS # BLD AUTO: 0.04 THOUSANDS/ΜL (ref 0–0.1)
BASOPHILS NFR BLD AUTO: 1 % (ref 0–1)
BILIRUB DIRECT SERPL-MCNC: 0.13 MG/DL (ref 0–0.2)
BILIRUB SERPL-MCNC: 0.52 MG/DL (ref 0.2–1)
EOSINOPHIL # BLD AUTO: 0.15 THOUSAND/ΜL (ref 0–0.61)
EOSINOPHIL NFR BLD AUTO: 3 % (ref 0–6)
ERYTHROCYTE [DISTWIDTH] IN BLOOD BY AUTOMATED COUNT: 14.1 % (ref 11.6–15.1)
HCT VFR BLD AUTO: 44.5 % (ref 34.8–46.1)
HGB BLD-MCNC: 14.3 G/DL (ref 11.5–15.4)
IMM GRANULOCYTES # BLD AUTO: 0.01 THOUSAND/UL (ref 0–0.2)
IMM GRANULOCYTES NFR BLD AUTO: 0 % (ref 0–2)
LYMPHOCYTES # BLD AUTO: 1.88 THOUSANDS/ΜL (ref 0.6–4.47)
LYMPHOCYTES NFR BLD AUTO: 34 % (ref 14–44)
MCH RBC QN AUTO: 31.4 PG (ref 26.8–34.3)
MCHC RBC AUTO-ENTMCNC: 32.1 G/DL (ref 31.4–37.4)
MCV RBC AUTO: 98 FL (ref 82–98)
MONOCYTES # BLD AUTO: 0.49 THOUSAND/ΜL (ref 0.17–1.22)
MONOCYTES NFR BLD AUTO: 9 % (ref 4–12)
NEUTROPHILS # BLD AUTO: 3.02 THOUSANDS/ΜL (ref 1.85–7.62)
NEUTS SEG NFR BLD AUTO: 53 % (ref 43–75)
NRBC BLD AUTO-RTO: 0 /100 WBCS
PLATELET # BLD AUTO: 302 THOUSANDS/UL (ref 149–390)
PMV BLD AUTO: 10.8 FL (ref 8.9–12.7)
PROT SERPL-MCNC: 7.7 G/DL (ref 6.4–8.2)
RBC # BLD AUTO: 4.55 MILLION/UL (ref 3.81–5.12)
T4 FREE SERPL-MCNC: 0.93 NG/DL (ref 0.76–1.46)
TSH SERPL DL<=0.05 MIU/L-ACNC: 4.08 UIU/ML (ref 0.36–3.74)
VIT B12 SERPL-MCNC: 253 PG/ML (ref 100–900)
WBC # BLD AUTO: 5.59 THOUSAND/UL (ref 4.31–10.16)

## 2021-05-26 PROCEDURE — 85025 COMPLETE CBC W/AUTO DIFF WBC: CPT

## 2021-05-26 PROCEDURE — 84439 ASSAY OF FREE THYROXINE: CPT

## 2021-05-26 PROCEDURE — 84425 ASSAY OF VITAMIN B-1: CPT

## 2021-05-26 PROCEDURE — 36415 COLL VENOUS BLD VENIPUNCTURE: CPT

## 2021-05-26 PROCEDURE — 80076 HEPATIC FUNCTION PANEL: CPT

## 2021-05-26 PROCEDURE — 83918 ORGANIC ACIDS TOTAL QUANT: CPT

## 2021-05-26 PROCEDURE — 84207 ASSAY OF VITAMIN B-6: CPT

## 2021-05-26 PROCEDURE — 82607 VITAMIN B-12: CPT

## 2021-05-26 PROCEDURE — 84443 ASSAY THYROID STIM HORMONE: CPT

## 2021-05-27 ENCOUNTER — TELEPHONE (OUTPATIENT)
Dept: NEUROLOGY | Facility: CLINIC | Age: 51
End: 2021-05-27

## 2021-05-27 NOTE — TELEPHONE ENCOUNTER
----- Message from Ivy Sweet MD sent at 5/27/2021  5:48 AM EDT -----  Let pt know lfts ok, but vit b12 in low range at 253  rec follow up with pcp for consideration of im replacement and etiology of lower levels  Also tsh slightly elevated as well  Needs follow up with pcp  Send copy of labs to pcp

## 2021-05-30 LAB — VIT B1 BLD-SCNC: 159.3 NMOL/L (ref 66.5–200)

## 2021-05-31 LAB — VIT B6 SERPL-MCNC: 5.8 UG/L (ref 2–32.8)

## 2021-06-01 ENCOUNTER — TELEPHONE (OUTPATIENT)
Dept: NEUROLOGY | Facility: CLINIC | Age: 51
End: 2021-06-01

## 2021-06-01 NOTE — TELEPHONE ENCOUNTER
*2 open encounters    Received call from pt's  regarding separate issue  Received verbal consent from the pt to discuss her medical information with  Nuno Warren  Awaiting call back from Nuno Warren

## 2021-06-01 NOTE — TELEPHONE ENCOUNTER
*2 open encounters    Pt's  Art Bowles left voicemail stating they dropped off forms at pt's last visit  States they received notification that the form was not completed in its entirety  [phone]    Per chart review, the second portion of pt's form was not completed as it a functional capacity evaluation  Our office does not assess this  No communication consent is on file  Did attempt to contact pt at home #, left voicemail for a call back  Called and spoke with Art Bowles  Explained that I cannot discuss pt's medical information with him with without pt's consent  Advised I will send new communication consent form in the mail as well  He provided pt's cell phone #   [phone] so that we may obtain verbal consent  Called pt's cell phone #  She provided verbal consent to discuss her medical information with her  Art Bowles  Called Art Wilbur back and left message for a call back  Awaiting call back  Communication consent form mailed

## 2021-06-01 NOTE — TELEPHONE ENCOUNTER
Sanjay Rowell calling back  He reviewed letter they had received  He reports page 2 was completely missing including the signature  Did discuss functional capacity portion of form will not be completed as we do not assess this  Pj Espinoza I will fax form again  Form faxed successfully

## 2021-06-05 LAB
METHYLMALONATE SERPL-SCNC: 154 NMOL/L (ref 0–378)
SL AMB DISCLAIMER: NORMAL

## 2021-06-18 ENCOUNTER — VBI (OUTPATIENT)
Dept: ADMINISTRATIVE | Facility: OTHER | Age: 51
End: 2021-06-18

## 2021-07-02 ENCOUNTER — TELEPHONE (OUTPATIENT)
Dept: NEUROLOGY | Facility: CLINIC | Age: 51
End: 2021-07-02

## 2021-07-02 NOTE — TELEPHONE ENCOUNTER
Received medical record request from [facility] [address]  faxed to [facility] and scanned in the chart

## 2021-07-15 ENCOUNTER — TELEPHONE (OUTPATIENT)
Dept: UROLOGY | Facility: HOSPITAL | Age: 51
End: 2021-07-15

## 2021-07-15 NOTE — TELEPHONE ENCOUNTER
Working Recall List, 1st attempt to contact pt, lmom to call the office to sched an appointment for Return in about 1 year (around 4/24/2021) for Next scheduled follow up   , No appt spot held for this patient

## 2021-07-26 NOTE — TELEPHONE ENCOUNTER
Mailed *attempt to contact* letter to patient on 7-26, asking her to contact our office to schedule overdue 1 year f/u

## 2021-10-30 DIAGNOSIS — G35 MULTIPLE SCLEROSIS (HCC): ICD-10-CM

## 2021-11-01 RX ORDER — INTERFERON BETA-1A 44 UG/.5ML
INJECTION, SOLUTION SUBCUTANEOUS
Qty: 6 ML | Refills: 3 | Status: SHIPPED | OUTPATIENT
Start: 2021-11-01 | End: 2022-03-18 | Stop reason: SDUPTHER

## 2021-11-02 ENCOUNTER — TELEPHONE (OUTPATIENT)
Dept: NEUROLOGY | Facility: CLINIC | Age: 51
End: 2021-11-02

## 2021-11-10 ENCOUNTER — OFFICE VISIT (OUTPATIENT)
Dept: NEUROLOGY | Facility: CLINIC | Age: 51
End: 2021-11-10
Payer: COMMERCIAL

## 2021-11-10 VITALS
WEIGHT: 166 LBS | HEIGHT: 70 IN | BODY MASS INDEX: 23.77 KG/M2 | TEMPERATURE: 97.2 F | SYSTOLIC BLOOD PRESSURE: 110 MMHG | DIASTOLIC BLOOD PRESSURE: 78 MMHG

## 2021-11-10 DIAGNOSIS — G35 MULTIPLE SCLEROSIS (HCC): Primary | ICD-10-CM

## 2021-11-10 DIAGNOSIS — G31.84 MILD COGNITIVE IMPAIRMENT: ICD-10-CM

## 2021-11-10 DIAGNOSIS — E53.8 B12 DEFICIENCY: ICD-10-CM

## 2021-11-10 PROCEDURE — 99214 OFFICE O/P EST MOD 30 MIN: CPT | Performed by: PHYSICIAN ASSISTANT

## 2022-01-03 ENCOUNTER — HOSPITAL ENCOUNTER (OUTPATIENT)
Dept: MRI IMAGING | Facility: HOSPITAL | Age: 52
Discharge: HOME | End: 2022-01-03
Payer: COMMERCIAL

## 2022-01-03 DIAGNOSIS — G35 MULTIPLE SCLEROSIS (HCC): ICD-10-CM

## 2022-01-03 PROCEDURE — G1004 CDSM NDSC: HCPCS

## 2022-01-03 PROCEDURE — A9585 GADOBUTROL INJECTION: HCPCS | Performed by: PHYSICIAN ASSISTANT

## 2022-01-03 PROCEDURE — 70553 MRI BRAIN STEM W/O & W/DYE: CPT

## 2022-01-03 RX ADMIN — GADOBUTROL 7 ML: 604.72 INJECTION INTRAVENOUS at 18:25

## 2022-02-11 ENCOUNTER — APPOINTMENT (OUTPATIENT)
Dept: LAB | Facility: CLINIC | Age: 52
End: 2022-02-11
Payer: COMMERCIAL

## 2022-02-11 DIAGNOSIS — Z30.41 SURVEILLANCE OF PREVIOUSLY PRESCRIBED CONTRACEPTIVE PILL: ICD-10-CM

## 2022-02-11 LAB — FSH SERPL-ACNC: 21 MIU/ML

## 2022-02-11 PROCEDURE — 36415 COLL VENOUS BLD VENIPUNCTURE: CPT

## 2022-02-11 PROCEDURE — 83001 ASSAY OF GONADOTROPIN (FSH): CPT

## 2022-03-18 DIAGNOSIS — G35 MULTIPLE SCLEROSIS (HCC): ICD-10-CM

## 2022-03-18 RX ORDER — INTERFERON BETA-1A 44 UG/.5ML
44 INJECTION, SOLUTION SUBCUTANEOUS 3 TIMES WEEKLY
Qty: 6 ML | Refills: 11 | Status: SHIPPED | OUTPATIENT
Start: 2022-03-18

## 2022-03-28 ENCOUNTER — OFFICE VISIT (OUTPATIENT)
Dept: FAMILY MEDICINE CLINIC | Facility: CLINIC | Age: 52
End: 2022-03-28
Payer: MEDICARE

## 2022-03-28 VITALS
BODY MASS INDEX: 24.2 KG/M2 | WEIGHT: 169 LBS | HEART RATE: 98 BPM | DIASTOLIC BLOOD PRESSURE: 80 MMHG | TEMPERATURE: 98.7 F | SYSTOLIC BLOOD PRESSURE: 122 MMHG | HEIGHT: 70 IN

## 2022-03-28 DIAGNOSIS — E53.8 B12 DEFICIENCY: ICD-10-CM

## 2022-03-28 DIAGNOSIS — N39.0 URINARY TRACT INFECTION WITHOUT HEMATURIA, SITE UNSPECIFIED: Primary | ICD-10-CM

## 2022-03-28 DIAGNOSIS — E78.00 HYPERCHOLESTEROLEMIA: ICD-10-CM

## 2022-03-28 DIAGNOSIS — G31.9 DIFFUSE CEREBRAL ATROPHY (HCC): ICD-10-CM

## 2022-03-28 DIAGNOSIS — R53.83 OTHER FATIGUE: ICD-10-CM

## 2022-03-28 PROCEDURE — 99214 OFFICE O/P EST MOD 30 MIN: CPT | Performed by: FAMILY MEDICINE

## 2022-03-28 RX ORDER — NITROFURANTOIN 25; 75 MG/1; MG/1
100 CAPSULE ORAL 2 TIMES DAILY
Qty: 60 CAPSULE | Refills: 3 | Status: SHIPPED | OUTPATIENT
Start: 2022-03-28 | End: 2022-04-27

## 2022-04-07 NOTE — PROGRESS NOTES
Patient ID: Alyson Mena is a 45 y o  female  HPI: 45 y  o female presents for evaluation of symptoms of dysuria, urinary frequency and urgency  She is   Due for some labs and continues to follow with neurology due to diffuse cerebral atrophy  SUBJECTIVE    Family History   Problem Relation Age of Onset    Addiction problem Father         alcohol    Liver disease Father     Heart disease Father     Hypertension Father     Colon cancer Maternal Aunt     Diabetes Maternal Aunt      Social History     Socioeconomic History    Marital status: /Civil Union     Spouse name: Not on file    Number of children: Not on file    Years of education: Not on file    Highest education level: Not on file   Occupational History    Not on file   Tobacco Use    Smoking status: Never Smoker    Smokeless tobacco: Never Used   Substance and Sexual Activity    Alcohol use: Yes     Comment: occassionally    Drug use: No    Sexual activity: Not on file   Other Topics Concern    Not on file   Social History Narrative    Caffeine use    Currently      Social Determinants of Health     Financial Resource Strain: Not on file   Food Insecurity: Not on file   Transportation Needs: Not on file   Physical Activity: Not on file   Stress: Not on file   Social Connections: Not on file   Intimate Partner Violence: Not on file   Housing Stability: Not on file     Past Medical History:   Diagnosis Date    [facility]     Neurogenic bladder      Past Surgical History:   Procedure Laterality Date    KS COLONOSCOPY FLX DX W/COLLJ SPEC WHEN PFRMD N/A 6/8/2018    Procedure: COLONOSCOPY;  Surgeon: Elizabeth Rodriguez MD;  Location: [facility];   Service: Gastroenterology    WISDOM TOOTH EXTRACTION       Allergies   Allergen Reactions    Clindamycin     Penicillins        Current Outpatient Medications:     Ascorbic Acid (VITAMIN C) 100 MG tablet, Take 100 mg by mouth daily, Disp: , Rfl:     Cholecalciferol (VITAMIN D PO), Take by mouth, Disp: , Rfl:     Cyanocobalamin (VITAMIN B-12 PO), Take by mouth, Disp: , Rfl:     donepezil (ARICEPT) 5 mg tablet, TAKE 1 TABLET AT BEDTIME, Disp: 90 tablet, Rfl: 3    levonorgestrel-ethinyl estradiol (RAMIN-28) 0 15-30 MG-MCG per tablet, Take by mouth, Disp: , Rfl:     lubiprostone (AMITIZA) 24 mcg capsule, Take 1 capsule (24 mcg total) by mouth 2 (two) times a day with meals (Patient not taking: Reported on 11/2/2020), Disp: 60 capsule, Rfl: 6    nitrofurantoin (MACROBID) 100 mg capsule, Take 1 capsule (100 mg total) by mouth daily as needed (one tablet as needed after intercourse), Disp: 90 capsule, Rfl: 3    nitrofurantoin (MACROBID) 100 mg capsule, Take 1 capsule (100 mg total) by mouth 2 (two) times a day, Disp: 60 capsule, Rfl: 3    Omega-3 Fatty Acids (FISH OIL) 1200 MG CAPS, Take by mouth, Disp: , Rfl:     Rebif 44 MCG/0 5ML SOSY, Inject 0 5 mL (44 mcg total) under the skin 3 (three) times a week, Disp: 6 mL, Rfl: 11    valACYclovir (VALTREX) 1,000 mg tablet, Take 1 tablet (1,000 mg total) by mouth daily (Patient not taking: Reported on 11/10/2021 ), Disp: 30 tablet, Rfl: 0    Review of Systems  Constitutional:     Denies fever, chills ,fatigue ,weakness ,weight loss, weight gain     ENT: Denies earache ,loss of hearing ,nosebleed, nasal discharge,nasal congestion ,sore throat ,hoarseness  Pulmonary: Denies shortness of breath ,cough  ,dyspnea on exertion, orthopnea  ,PND   Cardiovascular:  Denies bradycardia , tachycardia  ,palpations, lower extremity edema leg, claudication  Breast:  Denies new or changing breast lumps ,nipple discharge ,nipple changes  Abdomen:  Denies abdominal pain , anorexia , indigestion, nausea, vomiting, constipation, diarrhea  Musculoskeletal: Denies myalgias, arthralgias, joint swelling, joint stiffness , limb pain, limb swelling  Gu: + dysuria, polyuria  Skin: Denies skin rash, skin lesion, skin wound, itching, dry skin  Neuro: Denies headache, numbness, tingling, confusion, loss of consciousness, dizziness, vertigo  Psychiatric: Denies feelings of depression, suicidal ideation, anxiety, sleep disturbances    OBJECTIVE  /80   Pulse 98   Temp 98 7 °F (37 1 °C)   Ht 5' 10" (1 778 m)   Wt 76 7 kg (169 lb)   BMI 24 25 kg/m²   Constitutional:   NAD, well appearing and well nourished      ENT:   Conjunctiva and lids: no injection, edema, or discharge     Pupils and iris: MARGARITA bilaterally    External inspection of ears and nose: normal without deformities or discharge  Otoscopic exam: Canals patent without erythema  Nasal mucosa, septum and turbinates: Normal or edema or discharge         Oropharynx:  Moist mucosa, normal tongue and tonsils without lesions  No erythema        Pulmonary:Respiratory effort normal rate and rhythm, no increased work of breathing  Auscultation of lungs:  Clear bilaterally with no adventitious breath sounds       Cardiovascular: regular rate and rhythm, S1 and S2, no murmur, no edema and/or varicosities of LE      Abdomen: Soft and non-distended     Positive bowel sounds      No heptomegaly or splenomegaly      Gu: no suprapubic tenderness or CVA tenderness, no urethral discharge  Lymphatic:  No anterior or posterior cervical lymphadenopathy         Musculoskeletal:  Gait and station: Normal gait      Digits and nails normal without clubbing or cyanosis       Inspection/palpation of joints, bones, and muscles:  No joint tenderness, swelling, full active and passive range of motion       Skin: Normal skin turgor and no rashes    Neuro:      Normal reflexes    Psych:   alert and oriented to person, place and time     normal mood and affect       Assessment/Plan:Diagnoses and all orders for this visit:    Urinary tract infection without hematuria, site unspecified  -     nitrofurantoin (MACROBID) 100 mg capsule;  Take 1 capsule (100 mg total) by mouth 2 (two) times a day    B12 deficiency  -     Vitamin B12; Future    Hypercholesterolemia  -     Lipid Panel with Direct LDL reflex; Future    Diffuse cerebral atrophy [facility])  Comments:  Continuewiht neurology followup  Other fatigue  -     CBC and Platelet; Future  -     Comprehensive metabolic panel; Future        Reviewed with patient plan to treat with above plan      Patient instructed to call in 72 hours if not feeling better or if symptoms worsen

## 2022-04-08 ENCOUNTER — APPOINTMENT (OUTPATIENT)
Dept: LAB | Facility: CLINIC | Age: 52
End: 2022-04-08
Payer: COMMERCIAL

## 2022-04-08 DIAGNOSIS — E53.8 B12 DEFICIENCY: ICD-10-CM

## 2022-04-08 DIAGNOSIS — R53.83 OTHER FATIGUE: ICD-10-CM

## 2022-04-08 DIAGNOSIS — G35 MULTIPLE SCLEROSIS (HCC): ICD-10-CM

## 2022-04-08 DIAGNOSIS — E78.00 HYPERCHOLESTEROLEMIA: ICD-10-CM

## 2022-04-08 LAB
ALBUMIN SERPL BCP-MCNC: 3.8 G/DL (ref 3.5–5)
ALP SERPL-CCNC: 70 U/L (ref 46–116)
ALT SERPL W P-5'-P-CCNC: 41 U/L (ref 12–78)
ANION GAP SERPL CALCULATED.3IONS-SCNC: -1 MMOL/L (ref 4–13)
AST SERPL W P-5'-P-CCNC: 28 U/L (ref 5–45)
BASOPHILS # BLD AUTO: 0.05 THOUSANDS/ΜL (ref 0–0.1)
BASOPHILS NFR BLD AUTO: 1 % (ref 0–1)
BILIRUB SERPL-MCNC: 0.38 MG/DL (ref 0.2–1)
BUN SERPL-MCNC: 17 MG/DL (ref 5–25)
CALCIUM SERPL-MCNC: 9.8 MG/DL (ref 8.3–10.1)
CHLORIDE SERPL-SCNC: 107 MMOL/L (ref 100–108)
CHOLEST SERPL-MCNC: 227 MG/DL
CO2 SERPL-SCNC: 31 MMOL/L (ref 21–32)
CREAT SERPL-MCNC: 0.99 MG/DL (ref 0.6–1.3)
EOSINOPHIL # BLD AUTO: 0.08 THOUSAND/ΜL (ref 0–0.61)
EOSINOPHIL NFR BLD AUTO: 1 % (ref 0–6)
ERYTHROCYTE [DISTWIDTH] IN BLOOD BY AUTOMATED COUNT: 13.3 % (ref 11.6–15.1)
GFR SERPL CREATININE-BSD FRML MDRD: 65 ML/MIN/1.73SQ M
GLUCOSE P FAST SERPL-MCNC: 77 MG/DL (ref 65–99)
HCT VFR BLD AUTO: 46.6 % (ref 34.8–46.1)
HDLC SERPL-MCNC: 48 MG/DL
HGB BLD-MCNC: 14.5 G/DL (ref 11.5–15.4)
IMM GRANULOCYTES # BLD AUTO: 0.01 THOUSAND/UL (ref 0–0.2)
IMM GRANULOCYTES NFR BLD AUTO: 0 % (ref 0–2)
LDLC SERPL CALC-MCNC: 142 MG/DL (ref 0–100)
LYMPHOCYTES # BLD AUTO: 2.14 THOUSANDS/ΜL (ref 0.6–4.47)
LYMPHOCYTES NFR BLD AUTO: 37 % (ref 14–44)
MCH RBC QN AUTO: 30.4 PG (ref 26.8–34.3)
MCHC RBC AUTO-ENTMCNC: 31.1 G/DL (ref 31.4–37.4)
MCV RBC AUTO: 98 FL (ref 82–98)
MONOCYTES # BLD AUTO: 0.56 THOUSAND/ΜL (ref 0.17–1.22)
MONOCYTES NFR BLD AUTO: 10 % (ref 4–12)
NEUTROPHILS # BLD AUTO: 2.96 THOUSANDS/ΜL (ref 1.85–7.62)
NEUTS SEG NFR BLD AUTO: 51 % (ref 43–75)
NRBC BLD AUTO-RTO: 0 /100 WBCS
PLATELET # BLD AUTO: 215 THOUSANDS/UL (ref 149–390)
PMV BLD AUTO: 11 FL (ref 8.9–12.7)
POTASSIUM SERPL-SCNC: 4.8 MMOL/L (ref 3.5–5.3)
PROT SERPL-MCNC: 8.1 G/DL (ref 6.4–8.2)
RBC # BLD AUTO: 4.77 MILLION/UL (ref 3.81–5.12)
SODIUM SERPL-SCNC: 137 MMOL/L (ref 136–145)
TRIGL SERPL-MCNC: 185 MG/DL
VIT B12 SERPL-MCNC: 505 PG/ML (ref 100–900)
WBC # BLD AUTO: 5.8 THOUSAND/UL (ref 4.31–10.16)

## 2022-04-08 PROCEDURE — 85025 COMPLETE CBC W/AUTO DIFF WBC: CPT

## 2022-04-08 PROCEDURE — 80061 LIPID PANEL: CPT

## 2022-04-08 PROCEDURE — 82607 VITAMIN B-12: CPT

## 2022-04-08 PROCEDURE — 80053 COMPREHEN METABOLIC PANEL: CPT

## 2022-04-08 PROCEDURE — 36415 COLL VENOUS BLD VENIPUNCTURE: CPT

## 2022-04-12 DIAGNOSIS — G31.84 MILD COGNITIVE IMPAIRMENT: ICD-10-CM

## 2022-04-12 RX ORDER — DONEPEZIL HYDROCHLORIDE 5 MG/1
TABLET, FILM COATED ORAL
Qty: 90 TABLET | Refills: 3 | Status: SHIPPED | OUTPATIENT
Start: 2022-04-12

## 2022-04-26 DIAGNOSIS — B00.9 HSV INFECTION: ICD-10-CM

## 2022-04-26 RX ORDER — VALACYCLOVIR HYDROCHLORIDE 1 G/1
1000 TABLET, FILM COATED ORAL DAILY
Qty: 30 TABLET | Refills: 0 | Status: SHIPPED | OUTPATIENT
Start: 2022-04-26 | End: 2022-06-03 | Stop reason: SDUPTHER

## 2022-05-02 ENCOUNTER — HOSPITAL ENCOUNTER (OUTPATIENT)
Dept: RADIOLOGY | Facility: HOSPITAL | Age: 52
Discharge: HOME | End: 2022-05-02
Payer: COMMERCIAL

## 2022-05-02 VITALS — WEIGHT: 155 LBS | HEIGHT: 70 IN | BODY MASS INDEX: 22.19 KG/M2

## 2022-05-02 DIAGNOSIS — Z12.31 SCREENING MAMMOGRAM FOR HIGH-RISK PATIENT: ICD-10-CM

## 2022-05-02 PROCEDURE — 77067 SCR MAMMO BI INCL CAD: CPT

## 2022-05-02 PROCEDURE — 77063 BREAST TOMOSYNTHESIS BI: CPT

## 2022-05-18 ENCOUNTER — TELEPHONE (OUTPATIENT)
Dept: NEUROLOGY | Facility: CLINIC | Age: 52
End: 2022-05-18

## 2022-05-23 ENCOUNTER — OFFICE VISIT (OUTPATIENT)
Dept: NEUROLOGY | Facility: CLINIC | Age: 52
End: 2022-05-23
Payer: COMMERCIAL

## 2022-05-23 VITALS
TEMPERATURE: 97 F | SYSTOLIC BLOOD PRESSURE: 133 MMHG | HEART RATE: 92 BPM | DIASTOLIC BLOOD PRESSURE: 71 MMHG | WEIGHT: 165 LBS | BODY MASS INDEX: 23.62 KG/M2 | HEIGHT: 70 IN

## 2022-05-23 DIAGNOSIS — G35 MULTIPLE SCLEROSIS (HCC): Primary | ICD-10-CM

## 2022-05-23 DIAGNOSIS — E53.8 B12 DEFICIENCY: ICD-10-CM

## 2022-05-23 DIAGNOSIS — G31.84 MILD COGNITIVE IMPAIRMENT: ICD-10-CM

## 2022-05-23 PROCEDURE — 99214 OFFICE O/P EST MOD 30 MIN: CPT | Performed by: PSYCHIATRY & NEUROLOGY

## 2022-05-23 NOTE — PROGRESS NOTES
Patient ID: Liz Burton is a 45 y o  female  Assessment/Plan:    Multiple sclerosis ([facility] [address] )  Pt here with spouse for neuro follow up  Pt overall doing well  Pt had covid in march, overall good recovery  No ms flare at time  No recent hospitalizations  Pt had updated mri head in jan 2022 and stable compt to 2017  Pt exam stable  Pt had udpated labs in April, cbc diff and lfts ok  Cont with rebif  No injection site or compliance issues  Repeat labs in 4 months  retn 4-6 months  Pt to call for any new sxs    Mild cognitive impairment  Exam stable  No new issues  No behavioral changes  Cont with aricept at present dose    B12 deficiency  Pt with low levels at last visit with labs from may 2021  Pt with increased oral supplement since last visit  Labs from April good with level at 505       Diagnoses and all orders for this visit:    Multiple sclerosis ([facility] [address] )  -     CBC and differential; Future  -     Hepatic function panel; Future    Mild cognitive impairment    B12 deficiency           Subjective:    HPI    Patient is a 45year old female who presents for MS follow up, last seen in Nov 10 2021 by karen willett  Per karen last note "Patient was diagnosed with MS in [address] [place] [address] initial symptoms were numbness of her hands and toes, difficulty with balance   She has also had longstanding difficulty with short-term memory, unable to work due to her cognitive difficulties  Patient was originally on Betaseron for 8 years at time of diagnosis  Hardesty Merritts was then changed to Copaxone   She was on Copaxone for about 7 years and then changed to Rebif   Patient remains on Rebif at this time   Last MRI brain was completed 10/6/2017 and stable compared to 2 years prior  Ester Winston is global cerebral atrophy, greater than expected for patient's stated age  Mamta Aldana had a neuro cognitive assessment with Dr Himanshu Bruner   This did show significant impairment in memory, most notably verbal memory   She demonstrated difficulty learning new verbal information, even with multiple repetitions, and poor recall/retention of such information in the short-term and long-term  Dr Shahida Lane suggested continuing the compensatory strategies that have already been implemented by the patient and her   Ramandeep Beard and  have been doing a lot with note taking, calendars   This has been very helpful for her   Dr Shahida Lane also suggested ST/OT which patient has done in the past and was very taxing and draining for her  Dr Shahida Lane also suggested possibly having a conversation about medications to help memory  Patient was started on Aricept  She had increased GI side effects at the 10mg dose, so it was reduced back to 5mg "  Kept above paragraph due to complexities of pt case  Overall pt doing well  No new ms sxs  No recent ms flare  No recent hospitalizations  Pt and spouse did have covid in march  Pt had been vaccinated and booster  Pt notes overall good recovery for herself and no residual sxs currently  No falls  occ tripping  Pt will be going to [place] site in June and looking forward to it, back in [address]  No change in bowel or bladder  No loc  No sz  No change in vision  No loss of vision  No change in bowel or bladder  Pt remains on rebif and sukumar med well  No change in memory or mood or issues with judgement  Pt did have low vit b12 labs in may 2021  Pt level at that time was 253  Now with April labs up to 505  Pt also had nl cbc diff and lfts as well  Pt follows with [facility] and had recent updated glasses  Pt due to see dr Carlos Powell,  It has been about 2 yrs and pt will make a follow up appt  Pt had updated mri head 1/3/22 no new wm changes noted  No enh lesions  No changes comp to Oct 2017  Pt's ast and alt normal   gfr good at 65  ALC and ANC good  Pt denies any vertigo  No ha or n or v   Pt remains on aricept and sukumar present dose    No new memory concerns           The following portions of the patient's history were reviewed and updated as appropriate: allergies, current medications, past family history, past medical history, past social history, past surgical history and problem list and med rec and ros rev  Objective:    Blood pressure 133/71, pulse 92, temperature (!) 97 °F (36 1 °C), height 5' 10" (1 778 m), weight 74 8 kg (165 lb), not currently breastfeeding  Physical Exam  Constitutional:       General: She is not in acute distress  Appearance: She is not ill-appearing  Eyes:      General: Lids are normal       Extraocular Movements: Extraocular movements intact  Pupils: Pupils are equal, round, and reactive to light  Musculoskeletal:      Right lower leg: No edema  Left lower leg: No edema  Neurological:      Mental Status: She is alert  Deep Tendon Reflexes: Strength normal and reflexes are normal and symmetric  Psychiatric:         Speech: Speech normal          Neurological Exam  Mental Status  Alert  Speech is normal  Language is fluent with no aphasia  Cranial Nerves  CN II: Visual acuity is normal  Visual fields full to confrontation  CN III, IV, VI: Extraocular movements intact bilaterally  Normal lids and orbits bilaterally  Pupils equal round and reactive to light bilaterally  CN V: Facial sensation is normal   CN VII: Full and symmetric facial movement  CN VIII: Hearing is normal   CN IX, X: Palate elevates symmetrically  Normal gag reflex  CN XI: Shoulder shrug strength is normal   CN XII: Tongue midline without atrophy or fasciculations  Motor  Normal muscle bulk throughout  Normal muscle tone  No abnormal involuntary movements  Strength is 5/5 throughout all four extremities  Sensory  Sensation is intact to light touch, pinprick, vibration and proprioception in all four extremities  Reflexes  Deep tendon reflexes are 2+ and symmetric in all four extremities      Coordination  Right: Finger-to-nose normal  Rapid alternating movement normal Left: Finger-to-nose normal  Heel-to-shin normal     Gait  Casual gait is normal including stance, stride, and arm swing  ROS:    Review of Systems   Constitutional: Negative  Negative for appetite change and fever  HENT: Negative  Negative for hearing loss, tinnitus, trouble swallowing and voice change  Eyes: Negative  Negative for photophobia and pain  Respiratory: Negative  Negative for shortness of breath  Cardiovascular: Negative  Negative for palpitations  Gastrointestinal: Negative  Negative for nausea and vomiting  Endocrine: Negative  Negative for cold intolerance  Genitourinary: Negative  Negative for dysuria, frequency and urgency  Musculoskeletal: Negative  Negative for myalgias and neck pain  Skin: Negative  Negative for rash  Neurological: Negative  Negative for dizziness, tremors, seizures, syncope, facial asymmetry, speech difficulty, weakness, light-headedness, numbness and headaches  Hematological: Negative  Does not bruise/bleed easily  Psychiatric/Behavioral: Negative  Negative for confusion, hallucinations and sleep disturbance  All other systems reviewed and are negative      Patient states there are no new issues to address

## 2022-05-23 NOTE — ASSESSMENT & PLAN NOTE
Pt with low levels at last visit with labs from may 2021  Pt with increased oral supplement since last visit  Labs from April good with level at 505

## 2022-05-23 NOTE — ASSESSMENT & PLAN NOTE
Pt here with spouse for neuro follow up  Pt overall doing well  Pt had covid in march, overall good recovery  No ms flare at time  No recent hospitalizations  Pt had updated mri head in jan 2022 and stable compt to 2017  Pt exam stable  Pt had udpated labs in April, cbc diff and lfts ok  Cont with rebif  No injection site or compliance issues  Repeat labs in 4 months  retn 4-6 months  Pt to call for any new sxs

## 2022-06-03 DIAGNOSIS — B00.9 HSV INFECTION: ICD-10-CM

## 2022-06-03 RX ORDER — VALACYCLOVIR HYDROCHLORIDE 1 G/1
1000 TABLET, FILM COATED ORAL DAILY
Qty: 30 TABLET | Refills: 3 | Status: SHIPPED | OUTPATIENT
Start: 2022-06-03 | End: 2022-07-03

## 2022-06-03 NOTE — TELEPHONE ENCOUNTER
Pt called asking if she can get 3 refills on her prescription of Valacyclovir  States she's going away for the summer and she gets bad blisters from the sun

## 2022-09-09 ENCOUNTER — TELEPHONE (OUTPATIENT)
Dept: NEUROLOGY | Facility: CLINIC | Age: 52
End: 2022-09-09

## 2022-09-09 NOTE — TELEPHONE ENCOUNTER
Reached out to patient, charges have been dropped and payment was received  Receipt has been scanned into this encounter  Please contact patient once forms are completed and faxed over

## 2022-09-09 NOTE — TELEPHONE ENCOUNTER
Received an Attending [de-identified] Statement via mail  Form is scanned into this encounter  Please let me know if this is something that we'll fill out for the patient so I can contact them for payment of forms

## 2022-10-21 ENCOUNTER — TELEPHONE (OUTPATIENT)
Dept: NEUROLOGY | Facility: CLINIC | Age: 52
End: 2022-10-21

## 2022-11-08 ENCOUNTER — OFFICE VISIT (OUTPATIENT)
Dept: NEUROLOGY | Facility: CLINIC | Age: 52
End: 2022-11-08

## 2022-11-08 VITALS
DIASTOLIC BLOOD PRESSURE: 63 MMHG | TEMPERATURE: 97.1 F | SYSTOLIC BLOOD PRESSURE: 151 MMHG | BODY MASS INDEX: 25.17 KG/M2 | HEIGHT: 70 IN | WEIGHT: 175.8 LBS | RESPIRATION RATE: 18 BRPM | HEART RATE: 77 BPM

## 2022-11-08 DIAGNOSIS — G31.84 MILD COGNITIVE IMPAIRMENT: ICD-10-CM

## 2022-11-08 DIAGNOSIS — E53.8 B12 DEFICIENCY: ICD-10-CM

## 2022-11-08 DIAGNOSIS — G35 MULTIPLE SCLEROSIS (HCC): Primary | ICD-10-CM

## 2022-11-08 NOTE — PATIENT INSTRUCTIONS
Continue Rebif  Update labs  Continue B12 supplement  Continue donepezil 5mg daily   Follow up in 6 months or sooner if needed

## 2022-11-08 NOTE — ASSESSMENT & PLAN NOTE
Patient remains clinically stable, no new or worsening MS symptoms  She continues on Rebif, no difficulty with the injections  MRI brain updated January 2022 was stable compared to October 2017  Labs last completed April 2022, will have updated labs at this time  Her neurologic exam is stable today

## 2022-11-08 NOTE — PROGRESS NOTES
Patient ID: Lance Vazquez is a 47 y o  female  Assessment/Plan:    Multiple sclerosis ([facility] [address] )  Patient remains clinically stable, no new or worsening MS symptoms  She continues on Rebif, no difficulty with the injections  MRI brain updated January 2022 was stable compared to October 2017  Labs last completed April 2022, will have updated labs at this time  Her neurologic exam is stable today  Mild cognitive impairment  Cognition is stable, continues on donepezil 5mg daily  Will monitor  B12 deficiency  On oral supplement, last B12 level was 505  Can continue oral supplement  Patient will follow up in 6 months or sooner if needed  Diagnoses and all orders for this visit:    Multiple sclerosis ([facility] [address] )    Mild cognitive impairment    B12 deficiency           Subjective:    HPI    Patient is a 47year old female who presents for MS follow up, last seen in May 2022  Patient was diagnosed with MS in 1993  Her initial symptoms were numbness of her hands and toes, difficulty with balance  She has also had longstanding difficulty with short-term memory, unable to work due to her cognitive difficulties  Patient was originally on Betaseron for 8 years at time of diagnosis  She was then changed to Copaxone  She was on Copaxone for about 7 years and then changed to Rebif  Patient remains on Rebif at this time  Last MRI brain was completed 10/6/2017 and stable compared to 2 years prior  There is global cerebral atrophy, greater than expected for patient's stated age  Patient had a neuro cognitive assessment with Dr Lakeshia Gavin  This did show significant impairment in memory, most notably verbal memory  She demonstrated difficulty learning new verbal information, even with multiple repetitions, and poor recall/retention of such information in the short-term and long-term   Dr Lakeshia Gavin suggested continuing the compensatory strategies that have already been implemented by the patient and her   Patient and  have been doing a lot with note taking, calendars  This has been very helpful for her  Dr Charles Mohan also suggested ST/OT which patient has done in the past and was very taxing and draining for her  Dr  Charles Mohan also suggested possibly having a conversation about medications to help memory  Patient was started on Aricept  She had increased GI side effects at the 10mg dose, so it was reduced back to 5mg  Today, patient presents with her   She is about the same, no new neurologic symptoms  She spent the summer at her [address], which she really enjoys  She continues on Rebif, no issues  She remains on Aricept 5mg daily, no major changes in cognition  She did not get updated labs before the visit today, last completed 4/8/22  CBC and LFTs normal  B12 505 (on oral supplement)  She did have an updated MRI brain on 1/3/22 and this was stable compared to 10/6/17  The following portions of the patient's history were reviewed and updated as appropriate: current medications, past family history, past medical history, past social history, past surgical history and problem list          Objective:    Blood pressure 151/63, pulse 77, temperature (!) 97 1 °F (36 2 °C), temperature source Tympanic, resp  rate 18, height 5' 10" (1 778 m), weight 79 7 kg (175 lb 12 8 oz)    Physical Exam  Constitutional:       Appearance: Normal appearance  HENT:      Head: Normocephalic and atraumatic  Eyes:      Extraocular Movements: EOM normal       Pupils: Pupils are equal, round, and reactive to light  Neurological:      Mental Status: She is alert  Coordination: Coordination is intact  Deep Tendon Reflexes: Strength normal and reflexes are normal and symmetric  Psychiatric:         Mood and Affect: Mood normal          Speech: Speech normal          Behavior: Behavior normal          Neurological Exam  Mental Status  Alert  Oriented to person, place, time and situation   Speech is normal  Language is fluent with no aphasia  Attention and concentration are normal     Cranial Nerves  CN II: Visual fields full to confrontation  CN III, IV, VI: Extraocular movements intact bilaterally  Pupils equal round and reactive to light bilaterally  CN V: Facial sensation is normal   CN VII: Full and symmetric facial movement  CN VIII: Hearing is normal   CN IX, X: Palate elevates symmetrically  CN XI: Shoulder shrug strength is normal   CN XII: Tongue midline without atrophy or fasciculations  Motor   Normal muscle tone  Strength is 5/5 throughout all four extremities  Sensory  Light touch is normal in upper and lower extremities  Reflexes  Deep tendon reflexes are 2+ and symmetric in all four extremities  Coordination    Finger-to-nose, rapid alternating movements and heel-to-shin normal bilaterally without dysmetria  Gait  Casual gait is normal including stance, stride, and arm swing  ROS:    Review of Systems   Constitutional: Negative for chills and fever  HENT: Negative for ear pain and sore throat  Eyes: Negative for pain and visual disturbance  Respiratory: Negative for cough and shortness of breath  Cardiovascular: Negative for chest pain and palpitations  Gastrointestinal: Negative for abdominal pain and vomiting  Genitourinary: Negative for dysuria and hematuria  Musculoskeletal: Negative for arthralgias and back pain  Skin: Negative for color change and rash  Neurological: Negative for seizures and syncope  All other systems reviewed and are negative      I personally reviewed and updated the ROS as appropriate

## 2022-11-17 ENCOUNTER — APPOINTMENT (OUTPATIENT)
Dept: LAB | Facility: CLINIC | Age: 52
End: 2022-11-17

## 2022-11-17 DIAGNOSIS — G35 MULTIPLE SCLEROSIS (HCC): ICD-10-CM

## 2022-11-17 LAB
ALBUMIN SERPL BCP-MCNC: 3.2 G/DL (ref 3.5–5)
ALP SERPL-CCNC: 63 U/L (ref 46–116)
ALT SERPL W P-5'-P-CCNC: 36 U/L (ref 12–78)
AST SERPL W P-5'-P-CCNC: 31 U/L (ref 5–45)
BASOPHILS # BLD AUTO: 0.04 THOUSANDS/ÂΜL (ref 0–0.1)
BASOPHILS NFR BLD AUTO: 1 % (ref 0–1)
BILIRUB DIRECT SERPL-MCNC: 0.07 MG/DL (ref 0–0.2)
BILIRUB SERPL-MCNC: 0.53 MG/DL (ref 0.2–1)
EOSINOPHIL # BLD AUTO: 0.08 THOUSAND/ÂΜL (ref 0–0.61)
EOSINOPHIL NFR BLD AUTO: 1 % (ref 0–6)
ERYTHROCYTE [DISTWIDTH] IN BLOOD BY AUTOMATED COUNT: 12.4 % (ref 11.6–15.1)
HCT VFR BLD AUTO: 44.8 % (ref 34.8–46.1)
HGB BLD-MCNC: 14.2 G/DL (ref 11.5–15.4)
IMM GRANULOCYTES # BLD AUTO: 0.02 THOUSAND/UL (ref 0–0.2)
IMM GRANULOCYTES NFR BLD AUTO: 0 % (ref 0–2)
LYMPHOCYTES # BLD AUTO: 1.99 THOUSANDS/ÂΜL (ref 0.6–4.47)
LYMPHOCYTES NFR BLD AUTO: 27 % (ref 14–44)
MCH RBC QN AUTO: 31.5 PG (ref 26.8–34.3)
MCHC RBC AUTO-ENTMCNC: 31.7 G/DL (ref 31.4–37.4)
MCV RBC AUTO: 99 FL (ref 82–98)
MONOCYTES # BLD AUTO: 0.63 THOUSAND/ÂΜL (ref 0.17–1.22)
MONOCYTES NFR BLD AUTO: 9 % (ref 4–12)
NEUTROPHILS # BLD AUTO: 4.58 THOUSANDS/ÂΜL (ref 1.85–7.62)
NEUTS SEG NFR BLD AUTO: 62 % (ref 43–75)
NRBC BLD AUTO-RTO: 0 /100 WBCS
PLATELET # BLD AUTO: 264 THOUSANDS/UL (ref 149–390)
PMV BLD AUTO: 11 FL (ref 8.9–12.7)
PROT SERPL-MCNC: 7.3 G/DL (ref 6.4–8.4)
RBC # BLD AUTO: 4.51 MILLION/UL (ref 3.81–5.12)
WBC # BLD AUTO: 7.34 THOUSAND/UL (ref 4.31–10.16)

## 2023-02-07 ENCOUNTER — TELEPHONE (OUTPATIENT)
Dept: NEUROLOGY | Facility: CLINIC | Age: 53
End: 2023-02-07

## 2023-02-07 NOTE — TELEPHONE ENCOUNTER
Patient's  Matheus Rashid called in wanting to speak with the  in the neurology department  He wanted to ask and go over the public partnership program he was told about  Wanted to know if our office made more information regarding that program and becoming direct care worker for his wife  He is unsure if that will go against their insurance and the care she has been receiving from Dr Sammie Bolanos      Can you please reach out to patient's  Matheus Rashid regarding this program?

## 2023-02-08 NOTE — TELEPHONE ENCOUNTER
SW called patient's spouse, Abhijeet Campos at [phone]  Abhijeet Campos questioning waiver program   Patient does not qualify due to income being too high  Spouse also aware that he could not be the caregiver for the patient  Expressed understanding  Discussed MS and patient involvement and would like information on the MS walk for the future  SW to provide information  SW will be available for future social needs

## 2023-02-14 NOTE — TELEPHONE ENCOUNTER
[facility]AC[employer] sent email to patient's spouse per his request on upcoming MS walk:    Email to [email or web address]    Sarah Dowd,     Please see attached information regarding the MS walk in April  I will have more information as it gets closer  Staff will be walking and will have a table set up at the event  Please let me know if you have questions  Thank you,     Verneda Meigs  Outpatient   [facility]  [address], [address], [address]  Ph# /[email or web address]   [email or web address]    Walk MS: Alondra 2023  Take a Step Toward a World Free of MS Saturday April 29, 2023 [place], [place] Site Opens: 9:00 am Walk Starts: 10:00 am Walk MS® brings together passionate people for a powerful purpose: to end MS forever  Feel the support that can only come from a community whose steps fuel breakthroughs, solutions, and a cure  Join the movement today to show that together, we are stronger than MS  Date & Location  April 29, 2023  [facility]   [address], [address] [place] [address]    Route Length  1 mile and 3 mile routes       Start Time  Site Opens: 9:00AM  Walk Starts: 10:00 AM     Walk MS is a rain or shine event  In the event of severe weather - such as thunderstorms - you will be notified via email and social media of any cancellations or delays  See website for further details    Walk MS: Héctor 2023 ([employer])

## 2023-03-15 DIAGNOSIS — G35 MULTIPLE SCLEROSIS (HCC): ICD-10-CM

## 2023-03-15 RX ORDER — INTERFERON BETA-1A 44 UG/.5ML
INJECTION, SOLUTION SUBCUTANEOUS
Qty: 6 ML | Refills: 11 | Status: SHIPPED | OUTPATIENT
Start: 2023-03-15 | End: 2023-03-16

## 2023-03-16 DIAGNOSIS — G35 MULTIPLE SCLEROSIS (HCC): ICD-10-CM

## 2023-03-16 RX ORDER — INTERFERON BETA-1A 44 UG/.5ML
INJECTION, SOLUTION SUBCUTANEOUS
Qty: 6 ML | Refills: 11 | Status: SHIPPED | OUTPATIENT
Start: 2023-03-16

## 2023-03-27 ENCOUNTER — TELEPHONE (OUTPATIENT)
Dept: NEUROLOGY | Facility: CLINIC | Age: 53
End: 2023-03-27

## 2023-03-27 NOTE — TELEPHONE ENCOUNTER
LVMM for patient Dr Mikey Barroso will not be in office on the date of their appt  05/01/23 @ 11:00am [place]  Appt will be cxl and must be r/s  (Can make appt with Marisa Theodore) Gave direct # until 5:00pm today, central # for after

## 2023-04-05 ENCOUNTER — TELEPHONE (OUTPATIENT)
Dept: NEUROLOGY | Facility: CLINIC | Age: 53
End: 2023-04-05

## 2023-04-05 NOTE — TELEPHONE ENCOUNTER
LVMM for patient Dr Anny Valente will not be in office on the date of their appt  05/01/23 @ 11:00am [place]  Appt will be cxl and must be r/s  (Can make appt with Rosmery Phelan) Gave direct # until 5:00pm today, central # for after

## 2023-04-07 DIAGNOSIS — G31.84 MILD COGNITIVE IMPAIRMENT: ICD-10-CM

## 2023-04-07 RX ORDER — DONEPEZIL HYDROCHLORIDE 5 MG/1
TABLET, FILM COATED ORAL
Qty: 90 TABLET | Refills: 3 | Status: SHIPPED | OUTPATIENT
Start: 2023-04-07

## 2023-05-10 DIAGNOSIS — B00.9 HSV INFECTION: ICD-10-CM

## 2023-05-10 RX ORDER — VALACYCLOVIR HYDROCHLORIDE 1 G/1
1000 TABLET, FILM COATED ORAL DAILY
Qty: 30 TABLET | Refills: 0 | Status: SHIPPED | OUTPATIENT
Start: 2023-05-10 | End: 2023-06-09

## 2023-06-20 DIAGNOSIS — B00.9 HSV INFECTION: ICD-10-CM

## 2023-06-20 RX ORDER — VALACYCLOVIR HYDROCHLORIDE 1 G/1
1000 TABLET, FILM COATED ORAL DAILY
Qty: 30 TABLET | Refills: 0 | Status: SHIPPED | OUTPATIENT
Start: 2023-06-20 | End: 2023-07-20

## 2023-06-20 NOTE — TELEPHONE ENCOUNTER
Need to call she left a message stating she needs this sent to [facility] where she is located for the summer  She said it is a [facility] but I can not find it in the system without more info

## 2023-07-27 ENCOUNTER — TELEPHONE (OUTPATIENT)
Dept: FAMILY MEDICINE CLINIC | Facility: CLINIC | Age: 53
End: 2023-07-27

## 2023-07-27 NOTE — TELEPHONE ENCOUNTER
called and said dog scratched her eye and would like to know if there is any way you can see her today.

## 2023-07-27 NOTE — TELEPHONE ENCOUNTER
Dr. Markell Godinez updated on patient going into afib. Amiodarone bolus given with orders to repeat bolus if doesn't help and then start drip if second bolus doesn't help. Call the doctor if having to start the drip. Info was given to her    He stated he will call the office back

## 2023-07-31 DIAGNOSIS — B00.9 HSV INFECTION: ICD-10-CM

## 2023-08-01 RX ORDER — VALACYCLOVIR HYDROCHLORIDE 1 G/1
1000 TABLET, FILM COATED ORAL DAILY
Qty: 30 TABLET | Refills: 0 | Status: SHIPPED | OUTPATIENT
Start: 2023-08-01 | End: 2023-08-31

## 2023-09-19 ENCOUNTER — OFFICE VISIT (OUTPATIENT)
Dept: NEUROLOGY | Facility: CLINIC | Age: 53
End: 2023-09-19
Payer: COMMERCIAL

## 2023-09-19 VITALS
BODY MASS INDEX: 21.47 KG/M2 | HEIGHT: 70 IN | WEIGHT: 150 LBS | TEMPERATURE: 97.8 F | DIASTOLIC BLOOD PRESSURE: 82 MMHG | SYSTOLIC BLOOD PRESSURE: 108 MMHG | HEART RATE: 99 BPM

## 2023-09-19 DIAGNOSIS — G35 MULTIPLE SCLEROSIS (HCC): Primary | ICD-10-CM

## 2023-09-19 DIAGNOSIS — G31.84 MILD COGNITIVE IMPAIRMENT: ICD-10-CM

## 2023-09-19 PROCEDURE — 99214 OFFICE O/P EST MOD 30 MIN: CPT | Performed by: PSYCHIATRY & NEUROLOGY

## 2023-09-19 NOTE — ASSESSMENT & PLAN NOTE
Patient reports no new symptoms related to her MS. She has not having any bowel bladder incontinence, weakness, new numbness or tingling. She does have some numbness at the fingertips. She does not report any signs of optic neuritis. She is being treated with Rebif and reports no side effects. Her last MRI brain was in January 3, 2023 and this did not show any changes compared to the prior MRI. Her last CMP was November 17, 2023 and this showed the low albumin, otherwise was unremarkable. Her last B12 level was in April 2023 and this level was at 505. The patient is on oral supplements. Plan:  -Labs: Vitamin B12, CBC, hepatic panel  -Follow-up with eye doctor  -Follow-up with neurology in 6 months.

## 2023-09-19 NOTE — PROGRESS NOTES
Patient ID: Yin Aldana is a 46 y.o. female. Assessment/Plan:    Multiple sclerosis ([address])  Patient reports no new symptoms related to her MS. She has not having any bowel bladder incontinence, weakness, new numbness or tingling. She does have some numbness at the fingertips. She does not report any signs of optic neuritis. She is being treated with Rebif and reports no side effects. Her last MRI brain was in January 3, 2023 and this did not show any changes compared to the prior MRI. Her last CMP was November 17, 2023 and this showed the low albumin, otherwise was unremarkable. Her last B12 level was in April 2023 and this level was at 505. The patient is on oral supplements. Plan:  -Labs: Vitamin B12, CBC, hepatic panel  -Follow-up with eye doctor  -Follow-up with neurology in 6 months. Mild cognitive impairment  Patient reports some worsening of her cognition, she suspect this is due to finding out that her sister has been diagnosed with breast cancer. MoCA 19/30 today. She is taking 5 mg of donepezil and states that she has noticed a difference with this medication. According to prior documentation it was stated that she has tried 10 mg of donepezil in the past but Had to decrease the dose due to GI symptoms. Plan:  -Repeat [place] in 6 months.  -Continue donepezil 5 mg daily  -Follow-up with neurology in 6 months       Diagnoses and all orders for this visit:    Multiple sclerosis ([address])    Mild cognitive impairment           Subjective:    Radha Charles is 45 y/o F presenting for a f/u presenting for MS treatment. She does not have any new MS symptoms but does note new stressors such as her sister getting diagnosed with breast cancer. She notes no B/B incontinence but has continued increased urgency after meals. She denies any eye pain with eye movement or diplopia. She denies any muscle spasms. No difficulty swallowing. No weakness. She notes some numbness in her fingertips only. She is still on Rebif for her MS and reports no side effects. She continues to take Vitamin B12 supplements. No recent falls or trips. No recent hospitalizations. No swallowing difficulties. She did endorse one sinus infection recently. She also endorses a vertigo when she first wakes up but this resolves soon after. She is unsure of when her last eye appointment was. She still is having cognitive difficulties but has been doing well on donepezil, she states this has made a great impact on her cognition. She has had moments where she has left stoves on, she states she needs alarms to remind herself to turn them off. She does not drive. She pays her own checkbook, but  states that she has some difficulties remembering keep track of her payments.  pays most of the bills. The following portions of the patient's history were reviewed and updated as appropriate: allergies, current medications, past family history, past medical history, past social history, past surgical history and problem list.         Objective:    Blood pressure 108/82, pulse 99, temperature 97.8 °F (36.6 °C), height 5' 10" (1.778 m), weight 68 kg (150 lb), not currently breastfeeding. Physical Exam  Constitutional:       General: She is not in acute distress. Appearance: Normal appearance. She is normal weight. She is not ill-appearing, toxic-appearing or diaphoretic. HENT:      Head: Normocephalic and atraumatic. Right Ear: External ear normal.      Left Ear: External ear normal.      Mouth/Throat:      Mouth: Mucous membranes are moist.      Pharynx: No oropharyngeal exudate or posterior oropharyngeal erythema. Eyes:      General: Lids are normal.      Extraocular Movements: Extraocular movements intact and EOM normal.      Conjunctiva/sclera: Conjunctivae normal.      Pupils: Pupils are equal, round, and reactive to light. Musculoskeletal:      Cervical back: Normal range of motion.    Skin: General: Skin is warm and dry. Neurological:      Motor: Motor strength is normal.     Coordination: Coordination is intact. Deep Tendon Reflexes: Reflexes are normal and symmetric. Reflex Scores:       Tricep reflexes are 2+ on the right side and 2+ on the left side. Bicep reflexes are 2+ on the right side and 2+ on the left side. Brachioradialis reflexes are 2+ on the right side and 2+ on the left side. Patellar reflexes are 2+ on the right side and 2+ on the left side. Achilles reflexes are 2+ on the right side and 2+ on the left side. Psychiatric:         Mood and Affect: Mood normal.         Speech: Speech normal.         Neurological Exam  Mental Status  Awake, alert and oriented to person, place and time. Oriented to person, place, time and situation. Unable to copy figure. Clock drawing is normal. Speech is normal. Language is fluent with no aphasia. Attention and concentration are normal. Unable to perform serial calculations. Fund of knowledge is appropriate for level of education. Apraxia absent. MoCA 19/30  Of note the patient is going through some life stressors such as her sister being diagnosed with breast cancer. .    Cranial Nerves  CN II: Visual fields full to confrontation. CN III, IV, VI: Extraocular movements intact bilaterally. Extraocular movements intact bilaterally. Normal lids and orbits bilaterally. Pupils equal round and reactive to light bilaterally. CN V: Facial sensation is normal.  CN VII: Full and symmetric facial movement. CN VIII: Hearing is normal.  CN IX, X: Palate elevates symmetrically. Normal gag reflex. CN XI: Shoulder shrug strength is normal.  CN XII: Tongue midline without atrophy or fasciculations. Motor  Normal muscle bulk throughout. No fasciculations present. Normal muscle tone. No abnormal involuntary movements. Strength is 5/5 throughout all four extremities.     Sensory  Light touch is normal in upper and lower extremities. Temperature is normal in upper and lower extremities. Vibration is normal in upper and lower extremities. Proprioception is normal in upper and lower extremities. Reflexes  Deep tendon reflexes are 2+ and symmetric in all four extremities. Right                      Left  Brachioradialis                    2+                         2+  Biceps                                 2+                         2+  Triceps                                2+                         2+  Patellar                                2+                         2+  Achilles                                2+                         2+    Coordination    Finger-to-nose, rapid alternating movements and heel-to-shin normal bilaterally without dysmetria. Gait  Casual gait is normal including stance, stride, and arm swing. Toe walking abnormality: Heel walking abnormality: Tandem gait abnormality:  Toe, heel and tandem gait is unsteady. ROS:    Review of Systems   Constitutional: Negative for appetite change, fatigue and fever. HENT: Negative. Negative for hearing loss, tinnitus, trouble swallowing and voice change. Eyes: Negative. Negative for photophobia, pain and visual disturbance. Respiratory: Negative. Negative for shortness of breath. Cardiovascular: Negative. Negative for palpitations. Gastrointestinal: Negative. Negative for nausea and vomiting. Endocrine: Negative. Negative for cold intolerance. Genitourinary: Negative. Negative for dysuria, frequency and urgency. Musculoskeletal: Negative for back pain, gait problem, myalgias and neck pain. Skin: Negative. Negative for rash. Allergic/Immunologic: Negative. Neurological: Negative. Negative for dizziness, tremors, seizures, syncope, facial asymmetry, speech difficulty, weakness, light-headedness, numbness and headaches. Hematological: Negative. Does not bruise/bleed easily. Psychiatric/Behavioral: Negative. Negative for confusion, hallucinations and sleep disturbance. All other systems reviewed and are negative.     No new issues to address

## 2023-09-19 NOTE — ASSESSMENT & PLAN NOTE
Patient reports some worsening of her cognition, she suspect this is due to finding out that her sister has been diagnosed with breast cancer. MoCA 19/30 today. She is taking 5 mg of donepezil and states that she has noticed a difference with this medication. According to prior documentation it was stated that she has tried 10 mg of donepezil in the past but Had to decrease the dose due to GI symptoms.     Plan:  -Repeat [place] in 6 months.  -Continue donepezil 5 mg daily  -Follow-up with neurology in 6 months

## 2023-10-17 ENCOUNTER — OFFICE VISIT (OUTPATIENT)
Dept: FAMILY MEDICINE CLINIC | Facility: CLINIC | Age: 53
End: 2023-10-17

## 2023-10-17 VITALS
HEIGHT: 70 IN | WEIGHT: 177 LBS | SYSTOLIC BLOOD PRESSURE: 148 MMHG | OXYGEN SATURATION: 98 % | DIASTOLIC BLOOD PRESSURE: 84 MMHG | BODY MASS INDEX: 25.34 KG/M2 | TEMPERATURE: 97.9 F | HEART RATE: 80 BPM

## 2023-10-17 DIAGNOSIS — Z23 ENCOUNTER FOR IMMUNIZATION: ICD-10-CM

## 2023-10-17 DIAGNOSIS — M77.8 SHOULDER TENDONITIS, LEFT: Primary | ICD-10-CM

## 2023-10-17 RX ORDER — PREDNISONE 10 MG/1
TABLET ORAL
Qty: 26 TABLET | Refills: 0 | Status: SHIPPED | OUTPATIENT
Start: 2023-10-17

## 2024-03-19 ENCOUNTER — TELEPHONE (OUTPATIENT)
Dept: NEUROLOGY | Facility: CLINIC | Age: 54
End: 2024-03-19

## 2024-03-19 ENCOUNTER — OFFICE VISIT (OUTPATIENT)
Dept: NEUROLOGY | Facility: CLINIC | Age: 54
End: 2024-03-19
Payer: COMMERCIAL

## 2024-03-19 VITALS
HEIGHT: 70 IN | DIASTOLIC BLOOD PRESSURE: 88 MMHG | BODY MASS INDEX: 25.77 KG/M2 | TEMPERATURE: 98 F | HEART RATE: 83 BPM | SYSTOLIC BLOOD PRESSURE: 134 MMHG | WEIGHT: 180 LBS

## 2024-03-19 DIAGNOSIS — G31.84 MILD COGNITIVE IMPAIRMENT: ICD-10-CM

## 2024-03-19 DIAGNOSIS — G35 MULTIPLE SCLEROSIS (HCC): Primary | ICD-10-CM

## 2024-03-19 PROCEDURE — 99215 OFFICE O/P EST HI 40 MIN: CPT | Performed by: PSYCHIATRY & NEUROLOGY

## 2024-03-19 NOTE — TELEPHONE ENCOUNTER
Nursing and SW teams- saw pt today for routine MS follow up.  Pt on rebif for years and doing well with it.  However pt would like to go to oral med and looking at aubagio or generic for option.  Current cost of rebif very high for them.  Pt spouse considering halfway in next few years.  Pt has medicare but also currently BC/BS. She will lose BC/BS when spouse retires and just have medicare.  Can you help with seeing if we can change her to aubagio now and see what the cost would be for them or if any grants available for coverage of aubagio or generic.

## 2024-03-19 NOTE — PROGRESS NOTES
Patient ID: Genesis Castro is a 53 y.o. female.    Assessment/Plan:    Mild cognitive impairment  19/30 MOCA 09/2023, described benefit from donepezil, continuing on current regimen at this time     Multiple sclerosis (HCC)  Reports minimal sx in past several months, only a few days of leg dragging & eye deviation which pt & s.o. relate to significant stresses at the time, since resolved w/ pt back to baseline. Otherwise no new complaints, pt describes good quality of life. Pt requests medication change soon from Rebif to something taken po as pt getting bothered by all the injections &  likely to retire in coming months to [employer] & the current regimen is far too expensive to maintain indefinitely. Discussed Aubagio, which will be considered in the future if acceptable lab results & if price is ok to pt & family. Most recent MRI brain 01/2022.     Ordered required lab work  F/u in 6 months     Diagnoses and all orders for this visit:    Multiple sclerosis (HCC)    Mild cognitive impairment           Subjective:    Multiple Sclerosis:  Neurologic Problem  The patient's primary symptoms include focal weakness and a visual change. The patient's pertinent negatives include no altered mental status, clumsiness, focal sensory loss, loss of balance, memory loss, near-syncope, slurred speech, syncope or weakness. This is a chronic problem. The current episode started more than 1 year ago. The neurological problem developed gradually. The problem is unchanged. Pertinent negatives include no back pain, chest pain, confusion, dizziness, fatigue, fever, headaches, light-headedness, nausea, neck pain, palpitations, shortness of breath or vomiting. Past treatments include medication. The treatment provided significant relief. There is no history of a CVA, head trauma or mood changes.       Multiple Sclerosis    Symptoms are currently of mild and intermittent severity. Symptoms occur intermittently and last days. She was  "previously evaluated at [facility] neuro [facility] office. Previous evaluation has included MRI of brain, results were confirmatory for MS. Prior treatments include: interferon beta-1a (REBIF)    The following portions of the patient's history were reviewed and updated as appropriate: current medications, past medical history, past social history, and problem list.         Objective:    Blood pressure 134/88, pulse 83, temperature 98 °F (36.7 °C), height 5' 10\" (1.778 m), weight 81.6 kg (180 lb), not currently breastfeeding.    Physical Exam  Vitals and nursing note reviewed. Exam conducted with a chaperone present.   Constitutional:       General: She is not in acute distress.     Appearance: She is not ill-appearing, toxic-appearing or diaphoretic.   HENT:      Head: Normocephalic and atraumatic.      Nose: Nose normal. No congestion or rhinorrhea.   Eyes:      General: Lids are normal. No scleral icterus.        Right eye: No discharge.         Left eye: No discharge.      Extraocular Movements: Extraocular movements intact.      Conjunctiva/sclera: Conjunctivae normal.      Pupils: Pupils are equal, round, and reactive to light.   Cardiovascular:      Rate and Rhythm: Normal rate and regular rhythm.      Pulses: Normal pulses.      Heart sounds: Normal heart sounds.   Pulmonary:      Effort: Pulmonary effort is normal.      Breath sounds: Normal breath sounds.   Abdominal:      General: Bowel sounds are normal.      Tenderness: There is no abdominal tenderness. There is no guarding.   Musculoskeletal:         General: No swelling or tenderness.      Cervical back: No rigidity or tenderness.      Right lower leg: No edema.      Left lower leg: No edema.   Skin:     General: Skin is warm and dry.   Neurological:      General: No focal deficit present.      Mental Status: She is alert and oriented to person, place, and time. Mental status is at baseline.      Cranial Nerves: No cranial nerve deficit.      Sensory: No " sensory deficit.      Motor: Motor strength is normal.No weakness.      Coordination: Coordination is intact. Coordination normal.      Gait: Gait normal.      Deep Tendon Reflexes: Reflexes are normal and symmetric. Reflexes normal.   Psychiatric:         Mood and Affect: Mood normal.         Speech: Speech normal.         Behavior: Behavior normal.         Thought Content: Thought content normal.         Judgment: Judgment normal.         Neurological Exam  Mental Status  Alert. Oriented to person, place, time and situation. Oriented to person, place, and time. Speech is normal. Language is fluent with no aphasia.    Cranial Nerves  CN I: Sense of smell is normal.  CN II: Visual acuity is normal. Visual fields full to confrontation.  CN III, IV, VI: Extraocular movements intact bilaterally. Normal lids and orbits bilaterally. Pupils equal round and reactive to light bilaterally.  CN V: Facial sensation is normal.  CN VII: Full and symmetric facial movement.  CN VIII: Hearing is normal.  CN IX, X: Palate elevates symmetrically. Normal gag reflex.  CN XI: Shoulder shrug strength is normal.  CN XII: Tongue midline without atrophy or fasciculations.    Motor  Normal muscle bulk throughout. No fasciculations present. Normal muscle tone. No abnormal involuntary movements. Strength is 5/5 throughout all four extremities.    Sensory  Sensation is intact to light touch, pinprick, vibration and proprioception in all four extremities.    Reflexes  Deep tendon reflexes are 2+ and symmetric in all four extremities.    Coordination    Finger-to-nose, rapid alternating movements and heel-to-shin normal bilaterally without dysmetria.    Gait  Normal casual, toe, heel and tandem gait. Normal gait.        ROS:    Review of Systems   Constitutional:  Negative for appetite change, fatigue and fever.   HENT: Negative.  Negative for hearing loss, tinnitus, trouble swallowing and voice change.    Eyes: Negative.  Negative for  photophobia, pain and visual disturbance.   Respiratory: Negative.  Negative for shortness of breath.    Cardiovascular: Negative.  Negative for chest pain, palpitations, leg swelling and near-syncope.   Gastrointestinal: Negative.  Negative for nausea and vomiting.   Endocrine: Negative.  Negative for cold intolerance.   Genitourinary: Negative.  Negative for dysuria, frequency and urgency.   Musculoskeletal:  Negative for arthralgias, back pain, gait problem, joint swelling, myalgias, neck pain and neck stiffness.   Skin: Negative.  Negative for rash.   Allergic/Immunologic: Negative.  Negative for environmental allergies and food allergies.   Neurological:  Positive for focal weakness. Negative for dizziness, tremors, seizures, syncope, facial asymmetry, speech difficulty, weakness, light-headedness, numbness, headaches and loss of balance.   Hematological: Negative.  Does not bruise/bleed easily.   Psychiatric/Behavioral: Negative.  Negative for confusion, hallucinations, memory loss and sleep disturbance.    All other systems reviewed and are negative.    CK2 Positive. No other issues to address

## 2024-03-19 NOTE — TELEPHONE ENCOUNTER
Aubagio-  There are NO programs for Aubagio now that there is a generic.  [employer] [facility] no longer assists.       Teriflunomide-  [facility] currently has this medication for aproximately $17 for a 30 day supply (14mg) at [facility].  [employer] has a copay card however, since patient has medicare she is not eligible.     I would suggest that the clinical team attempt prior auth and/or determine the cost of each medication with patient's insurance.  It may be that the generic to Aubagio will be inexpensive.  Please let me know if you need anything else.  Thank you

## 2024-03-19 NOTE — ASSESSMENT & PLAN NOTE
Reports minimal sx in past several months, only a few days of leg dragging & eye deviation which pt & s.o. relate to significant stresses at the time, since resolved w/ pt back to baseline. Otherwise no new complaints, pt describes good quality of life. Pt requests medication change soon from Rebif to something taken po as pt getting bothered by all the injections &  likely to retire in coming months to [employer] & the current regimen is far too expensive to maintain indefinitely. Discussed Aubagio, which will be considered in the future if acceptable lab results & if price is ok to pt & family. Most recent MRI brain 01/2022.     Ordered required lab work  F/u in 6 months

## 2024-03-19 NOTE — TELEPHONE ENCOUNTER
Oh that could be great for pt.  Nursing, can you follow up with pt and see how she would like to proceed.

## 2024-03-22 ENCOUNTER — APPOINTMENT (OUTPATIENT)
Dept: LAB | Facility: CLINIC | Age: 54
End: 2024-03-22
Payer: COMMERCIAL

## 2024-03-22 DIAGNOSIS — G35 MULTIPLE SCLEROSIS (HCC): ICD-10-CM

## 2024-03-22 LAB
ALBUMIN SERPL BCP-MCNC: 4 G/DL (ref 3.5–5)
ALP SERPL-CCNC: 62 U/L (ref 34–104)
ALT SERPL W P-5'-P-CCNC: 32 U/L (ref 7–52)
AST SERPL W P-5'-P-CCNC: 28 U/L (ref 13–39)
BASOPHILS # BLD AUTO: 0.04 THOUSANDS/ÂΜL (ref 0–0.1)
BASOPHILS NFR BLD AUTO: 1 % (ref 0–1)
BILIRUB DIRECT SERPL-MCNC: 0.14 MG/DL (ref 0–0.2)
BILIRUB SERPL-MCNC: 0.64 MG/DL (ref 0.2–1)
EOSINOPHIL # BLD AUTO: 0.09 THOUSAND/ÂΜL (ref 0–0.61)
EOSINOPHIL NFR BLD AUTO: 1 % (ref 0–6)
ERYTHROCYTE [DISTWIDTH] IN BLOOD BY AUTOMATED COUNT: 12.9 % (ref 11.6–15.1)
HCT VFR BLD AUTO: 44.9 % (ref 34.8–46.1)
HGB BLD-MCNC: 14.4 G/DL (ref 11.5–15.4)
IMM GRANULOCYTES # BLD AUTO: 0.02 THOUSAND/UL (ref 0–0.2)
IMM GRANULOCYTES NFR BLD AUTO: 0 % (ref 0–2)
LYMPHOCYTES # BLD AUTO: 2.4 THOUSANDS/ÂΜL (ref 0.6–4.47)
LYMPHOCYTES NFR BLD AUTO: 35 % (ref 14–44)
MCH RBC QN AUTO: 30.6 PG (ref 26.8–34.3)
MCHC RBC AUTO-ENTMCNC: 32.1 G/DL (ref 31.4–37.4)
MCV RBC AUTO: 95 FL (ref 82–98)
MONOCYTES # BLD AUTO: 0.57 THOUSAND/ÂΜL (ref 0.17–1.22)
MONOCYTES NFR BLD AUTO: 8 % (ref 4–12)
NEUTROPHILS # BLD AUTO: 3.68 THOUSANDS/ÂΜL (ref 1.85–7.62)
NEUTS SEG NFR BLD AUTO: 55 % (ref 43–75)
NRBC BLD AUTO-RTO: 0 /100 WBCS
PLATELET # BLD AUTO: 227 THOUSANDS/UL (ref 149–390)
PMV BLD AUTO: 10.7 FL (ref 8.9–12.7)
PROT SERPL-MCNC: 7 G/DL (ref 6.4–8.4)
RBC # BLD AUTO: 4.71 MILLION/UL (ref 3.81–5.12)
VIT B12 SERPL-MCNC: 289 PG/ML (ref 180–914)
WBC # BLD AUTO: 6.8 THOUSAND/UL (ref 4.31–10.16)

## 2024-03-22 PROCEDURE — 36415 COLL VENOUS BLD VENIPUNCTURE: CPT

## 2024-03-22 PROCEDURE — 85025 COMPLETE CBC W/AUTO DIFF WBC: CPT

## 2024-03-22 PROCEDURE — 80076 HEPATIC FUNCTION PANEL: CPT

## 2024-03-22 PROCEDURE — 86480 TB TEST CELL IMMUN MEASURE: CPT

## 2024-03-22 PROCEDURE — 82607 VITAMIN B-12: CPT

## 2024-03-22 RX ORDER — INTERFERON BETA-1A 44 UG/.5ML
INJECTION, SOLUTION SUBCUTANEOUS
Qty: 6 ML | Refills: 11 | Status: SHIPPED | OUTPATIENT
Start: 2024-03-22

## 2024-03-22 NOTE — TELEPHONE ENCOUNTER
Please refill if appropriate.  Looks like you saw her this week and there was discussion of changing therapy

## 2024-03-23 LAB
GAMMA INTERFERON BACKGROUND BLD IA-ACNC: 0.05 IU/ML
M TB IFN-G BLD-IMP: NEGATIVE
M TB IFN-G CD4+ BCKGRND COR BLD-ACNC: 0 IU/ML
M TB IFN-G CD4+ BCKGRND COR BLD-ACNC: 0.02 IU/ML
MITOGEN IGNF BCKGRD COR BLD-ACNC: 9.95 IU/ML

## 2024-03-26 NOTE — TELEPHONE ENCOUNTER
SW also found-  [facility]  Teriflunomide  Tablet · 14mg · 30 count  $12.80

## 2024-03-29 ENCOUNTER — TELEPHONE (OUTPATIENT)
Dept: NEUROLOGY | Facility: CLINIC | Age: 54
End: 2024-03-29

## 2024-03-29 NOTE — TELEPHONE ENCOUNTER
----- Message from Niki Phipps MD sent at 3/22/2024  1:33 PM EDT -----  Let pt know vit b12 in low range at 289.  Rec follow up with pcp.  To consider IM injections thru pcp office.   Send copy of labs to pcp as well.

## 2024-03-29 NOTE — TELEPHONE ENCOUNTER
Spoke w/pt's  Desmond (on consent form). Advised him of results and recommendations below. He verbalized understanding.       Labs sent to PCP.

## 2024-04-01 DIAGNOSIS — G31.84 MILD COGNITIVE IMPAIRMENT: ICD-10-CM

## 2024-04-01 RX ORDER — DONEPEZIL HYDROCHLORIDE 5 MG/1
TABLET, FILM COATED ORAL
Qty: 90 TABLET | Refills: 3 | Status: SHIPPED | OUTPATIENT
Start: 2024-04-01

## 2024-04-02 ENCOUNTER — CLINICAL SUPPORT (OUTPATIENT)
Dept: FAMILY MEDICINE CLINIC | Facility: CLINIC | Age: 54
End: 2024-04-02
Payer: COMMERCIAL

## 2024-04-02 DIAGNOSIS — E53.8 VITAMIN B 12 DEFICIENCY: Primary | ICD-10-CM

## 2024-04-02 PROCEDURE — 96372 THER/PROPH/DIAG INJ SC/IM: CPT

## 2024-04-02 RX ORDER — CYANOCOBALAMIN 1000 UG/ML
1000 INJECTION, SOLUTION INTRAMUSCULAR; SUBCUTANEOUS
Status: CANCELLED | OUTPATIENT
Start: 2024-04-02

## 2024-04-02 RX ORDER — CYANOCOBALAMIN 1000 UG/ML
1000 INJECTION, SOLUTION INTRAMUSCULAR; SUBCUTANEOUS ONCE
Status: COMPLETED | OUTPATIENT
Start: 2024-04-02 | End: 2024-04-02

## 2024-04-02 RX ADMIN — CYANOCOBALAMIN 1000 MCG: 1000 INJECTION, SOLUTION INTRAMUSCULAR; SUBCUTANEOUS at 11:51

## 2024-04-09 DIAGNOSIS — G35 MULTIPLE SCLEROSIS (HCC): Primary | ICD-10-CM

## 2024-05-03 ENCOUNTER — TELEPHONE (OUTPATIENT)
Age: 54
End: 2024-05-03

## 2024-05-03 ENCOUNTER — TELEPHONE (OUTPATIENT)
Dept: OTHER | Facility: OTHER | Age: 54
End: 2024-05-03

## 2024-05-03 NOTE — TELEPHONE ENCOUNTER
Patients GI provider:  JERMAINE Mcmahon    Number to return call: [phone]    Reason for call: Pt's  called in for a refill for the Vitamin B12 but not info for dosage is in for this.     Scheduled procedure/appointment date if applicable: Apt/procedure N/A

## 2024-05-03 NOTE — TELEPHONE ENCOUNTER
Patient  called saying that he's returning a phone that he had got from the office regarding his wife medication. Patient  stated that he will give the office a call back on Monday.

## 2024-05-06 ENCOUNTER — TELEPHONE (OUTPATIENT)
Age: 54
End: 2024-05-06

## 2024-05-06 DIAGNOSIS — E53.8 B12 DEFICIENCY: Primary | ICD-10-CM

## 2024-05-06 RX ORDER — CYANOCOBALAMIN 1000 UG/ML
1000 INJECTION, SOLUTION INTRAMUSCULAR; SUBCUTANEOUS
Qty: 10 ML | Refills: 0 | Status: SHIPPED | OUTPATIENT
Start: 2024-05-06

## 2024-05-06 NOTE — TELEPHONE ENCOUNTER
Per patient PCP is going to prescribe B12 and self inject at home. Please send [facility] to [facility] if PCP is doing a 3 month supply. If only 1 month then to [facility]. Please advise

## 2024-05-16 DIAGNOSIS — B00.9 HSV INFECTION: ICD-10-CM

## 2024-05-16 RX ORDER — VALACYCLOVIR HYDROCHLORIDE 1 G/1
1000 TABLET, FILM COATED ORAL DAILY
Qty: 30 TABLET | Refills: 0 | Status: SHIPPED | OUTPATIENT
Start: 2024-05-16 | End: 2024-06-15

## 2024-05-16 NOTE — TELEPHONE ENCOUNTER
Medication: valACYclovir (VALTREX) 1,000 mg tablet     Dose/Frequency: Take 1 tablet (1,000 mg total) by mouth daily     Quantity: 30    Pharmacy: [id number] - [address] [place] [id number]     Office:   [x] PCP/Provider -   [] Speciality/Provider -     Does the patient have enough for 3 days?   [x] Yes   [] No - Send as HP to POD

## 2024-05-23 ENCOUNTER — APPOINTMENT (OUTPATIENT)
Dept: LAB | Facility: CLINIC | Age: 54
End: 2024-05-23
Payer: COMMERCIAL

## 2024-05-23 DIAGNOSIS — G35 MULTIPLE SCLEROSIS (HCC): ICD-10-CM

## 2024-05-23 LAB
ALBUMIN SERPL BCP-MCNC: 4.1 G/DL (ref 3.5–5)
ALP SERPL-CCNC: 60 U/L (ref 34–104)
ALT SERPL W P-5'-P-CCNC: 38 U/L (ref 7–52)
AST SERPL W P-5'-P-CCNC: 33 U/L (ref 13–39)
BASOPHILS # BLD AUTO: 0.05 THOUSANDS/ÂΜL (ref 0–0.1)
BASOPHILS NFR BLD AUTO: 1 % (ref 0–1)
BILIRUB DIRECT SERPL-MCNC: 0.12 MG/DL (ref 0–0.2)
BILIRUB SERPL-MCNC: 0.51 MG/DL (ref 0.2–1)
EOSINOPHIL # BLD AUTO: 0.12 THOUSAND/ÂΜL (ref 0–0.61)
EOSINOPHIL NFR BLD AUTO: 2 % (ref 0–6)
ERYTHROCYTE [DISTWIDTH] IN BLOOD BY AUTOMATED COUNT: 13.2 % (ref 11.6–15.1)
HCT VFR BLD AUTO: 47.4 % (ref 34.8–46.1)
HGB BLD-MCNC: 14.9 G/DL (ref 11.5–15.4)
IMM GRANULOCYTES # BLD AUTO: 0.02 THOUSAND/UL (ref 0–0.2)
IMM GRANULOCYTES NFR BLD AUTO: 0 % (ref 0–2)
LYMPHOCYTES # BLD AUTO: 1.9 THOUSANDS/ÂΜL (ref 0.6–4.47)
LYMPHOCYTES NFR BLD AUTO: 32 % (ref 14–44)
MCH RBC QN AUTO: 30.6 PG (ref 26.8–34.3)
MCHC RBC AUTO-ENTMCNC: 31.4 G/DL (ref 31.4–37.4)
MCV RBC AUTO: 97 FL (ref 82–98)
MONOCYTES # BLD AUTO: 0.49 THOUSAND/ÂΜL (ref 0.17–1.22)
MONOCYTES NFR BLD AUTO: 8 % (ref 4–12)
NEUTROPHILS # BLD AUTO: 3.4 THOUSANDS/ÂΜL (ref 1.85–7.62)
NEUTS SEG NFR BLD AUTO: 57 % (ref 43–75)
NRBC BLD AUTO-RTO: 0 /100 WBCS
PLATELET # BLD AUTO: 223 THOUSANDS/UL (ref 149–390)
PMV BLD AUTO: 11.5 FL (ref 8.9–12.7)
PROT SERPL-MCNC: 7.4 G/DL (ref 6.4–8.4)
RBC # BLD AUTO: 4.87 MILLION/UL (ref 3.81–5.12)
WBC # BLD AUTO: 5.98 THOUSAND/UL (ref 4.31–10.16)

## 2024-05-23 PROCEDURE — 80076 HEPATIC FUNCTION PANEL: CPT

## 2024-05-23 PROCEDURE — 85025 COMPLETE CBC W/AUTO DIFF WBC: CPT

## 2024-05-23 PROCEDURE — 36415 COLL VENOUS BLD VENIPUNCTURE: CPT

## 2024-10-01 ENCOUNTER — APPOINTMENT (OUTPATIENT)
Dept: LAB | Facility: CLINIC | Age: 54
End: 2024-10-01
Payer: COMMERCIAL

## 2024-10-01 ENCOUNTER — OFFICE VISIT (OUTPATIENT)
Dept: FAMILY MEDICINE CLINIC | Facility: CLINIC | Age: 54
End: 2024-10-01
Payer: COMMERCIAL

## 2024-10-01 ENCOUNTER — OFFICE VISIT (OUTPATIENT)
Dept: NEUROLOGY | Facility: CLINIC | Age: 54
End: 2024-10-01
Payer: COMMERCIAL

## 2024-10-01 VITALS
WEIGHT: 169 LBS | DIASTOLIC BLOOD PRESSURE: 86 MMHG | TEMPERATURE: 97.5 F | HEIGHT: 70 IN | SYSTOLIC BLOOD PRESSURE: 134 MMHG | BODY MASS INDEX: 24.2 KG/M2 | HEART RATE: 83 BPM | OXYGEN SATURATION: 100 %

## 2024-10-01 VITALS
SYSTOLIC BLOOD PRESSURE: 126 MMHG | DIASTOLIC BLOOD PRESSURE: 82 MMHG | HEIGHT: 70 IN | BODY MASS INDEX: 24.34 KG/M2 | TEMPERATURE: 97.2 F | WEIGHT: 170 LBS | HEART RATE: 86 BPM

## 2024-10-01 DIAGNOSIS — Z23 NEED FOR INFLUENZA VACCINATION: ICD-10-CM

## 2024-10-01 DIAGNOSIS — E53.8 VITAMIN B12 DEFICIENCY: ICD-10-CM

## 2024-10-01 DIAGNOSIS — G31.84 MILD COGNITIVE IMPAIRMENT: ICD-10-CM

## 2024-10-01 DIAGNOSIS — G35 MULTIPLE SCLEROSIS (HCC): Primary | ICD-10-CM

## 2024-10-01 DIAGNOSIS — G35 MULTIPLE SCLEROSIS (HCC): ICD-10-CM

## 2024-10-01 DIAGNOSIS — J06.9 UPPER RESPIRATORY TRACT INFECTION, UNSPECIFIED TYPE: Primary | ICD-10-CM

## 2024-10-01 LAB
ALBUMIN SERPL BCG-MCNC: 4 G/DL (ref 3.5–5)
ALP SERPL-CCNC: 68 U/L (ref 34–104)
ALT SERPL W P-5'-P-CCNC: 22 U/L (ref 7–52)
AST SERPL W P-5'-P-CCNC: 24 U/L (ref 13–39)
BASOPHILS # BLD AUTO: 0.07 THOUSANDS/ÂΜL (ref 0–0.1)
BASOPHILS NFR BLD AUTO: 1 % (ref 0–1)
BILIRUB DIRECT SERPL-MCNC: 0.05 MG/DL (ref 0–0.2)
BILIRUB SERPL-MCNC: 0.86 MG/DL (ref 0.2–1)
BUN SERPL-MCNC: 16 MG/DL (ref 5–25)
CREAT SERPL-MCNC: 0.87 MG/DL (ref 0.6–1.3)
EOSINOPHIL # BLD AUTO: 0.13 THOUSAND/ÂΜL (ref 0–0.61)
EOSINOPHIL NFR BLD AUTO: 2 % (ref 0–6)
ERYTHROCYTE [DISTWIDTH] IN BLOOD BY AUTOMATED COUNT: 13.5 % (ref 11.6–15.1)
GFR SERPL CREATININE-BSD FRML MDRD: 75 ML/MIN/1.73SQ M
HCT VFR BLD AUTO: 46.2 % (ref 34.8–46.1)
HGB BLD-MCNC: 14.3 G/DL (ref 11.5–15.4)
IMM GRANULOCYTES # BLD AUTO: 0.02 THOUSAND/UL (ref 0–0.2)
IMM GRANULOCYTES NFR BLD AUTO: 0 % (ref 0–2)
LYMPHOCYTES # BLD AUTO: 2.2 THOUSANDS/ÂΜL (ref 0.6–4.47)
LYMPHOCYTES NFR BLD AUTO: 34 % (ref 14–44)
MCH RBC QN AUTO: 30.6 PG (ref 26.8–34.3)
MCHC RBC AUTO-ENTMCNC: 31 G/DL (ref 31.4–37.4)
MCV RBC AUTO: 99 FL (ref 82–98)
MONOCYTES # BLD AUTO: 0.55 THOUSAND/ÂΜL (ref 0.17–1.22)
MONOCYTES NFR BLD AUTO: 8 % (ref 4–12)
NEUTROPHILS # BLD AUTO: 3.54 THOUSANDS/ÂΜL (ref 1.85–7.62)
NEUTS SEG NFR BLD AUTO: 55 % (ref 43–75)
NRBC BLD AUTO-RTO: 0 /100 WBCS
PLATELET # BLD AUTO: 292 THOUSANDS/UL (ref 149–390)
PMV BLD AUTO: 11.3 FL (ref 8.9–12.7)
PROT SERPL-MCNC: 7.2 G/DL (ref 6.4–8.4)
RBC # BLD AUTO: 4.67 MILLION/UL (ref 3.81–5.12)
VIT B12 SERPL-MCNC: 283 PG/ML (ref 180–914)
WBC # BLD AUTO: 6.51 THOUSAND/UL (ref 4.31–10.16)

## 2024-10-01 PROCEDURE — 82565 ASSAY OF CREATININE: CPT

## 2024-10-01 PROCEDURE — 80076 HEPATIC FUNCTION PANEL: CPT

## 2024-10-01 PROCEDURE — 90471 IMMUNIZATION ADMIN: CPT | Performed by: FAMILY MEDICINE

## 2024-10-01 PROCEDURE — 36415 COLL VENOUS BLD VENIPUNCTURE: CPT

## 2024-10-01 PROCEDURE — 99213 OFFICE O/P EST LOW 20 MIN: CPT | Performed by: FAMILY MEDICINE

## 2024-10-01 PROCEDURE — 84520 ASSAY OF UREA NITROGEN: CPT

## 2024-10-01 PROCEDURE — 82607 VITAMIN B-12: CPT

## 2024-10-01 PROCEDURE — 85025 COMPLETE CBC W/AUTO DIFF WBC: CPT

## 2024-10-01 PROCEDURE — 90673 RIV3 VACCINE NO PRESERV IM: CPT | Performed by: FAMILY MEDICINE

## 2024-10-01 PROCEDURE — 99214 OFFICE O/P EST MOD 30 MIN: CPT | Performed by: PSYCHIATRY & NEUROLOGY

## 2024-10-01 RX ORDER — BENZONATATE 200 MG/1
200 CAPSULE ORAL 3 TIMES DAILY PRN
Qty: 30 CAPSULE | Refills: 0 | Status: SHIPPED | OUTPATIENT
Start: 2024-10-01

## 2024-10-01 RX ORDER — AZITHROMYCIN 250 MG/1
TABLET, FILM COATED ORAL
Qty: 6 TABLET | Refills: 0 | Status: SHIPPED | OUTPATIENT
Start: 2024-10-01 | End: 2024-10-05

## 2024-10-01 NOTE — PROGRESS NOTES
Ambulatory Visit  Name: Genesis Castro      : 1970      MRN: [id number]  Encounter Provider: Niki Phipps MD  Encounter Date: 10/1/2024   Encounter department: [facility]    Assessment & Plan  Multiple sclerosis (HCC)  Patient here today for neuro follow-up.  Patient here with spouse.  Last seen for MS follow-up on 2024.  Patient had been in [place] for the past couple months.  Patient also had stressor with death of cat since our last visit.  Since last visit, patient has stopped her Rebif.  She is now on teriflunomide.  Patient started teriflunomide at the end of May.  Blood work in May was normal with CBC with differential white count 5.98, ANC 3.4, ALC 1.9.  Quant gold negative  AST 33, ALT 38.  Reviewed with patient need to have monthly CBC differential and LFTs while on teriflunomide.  Patient will go today to have her outstanding labs.  Patient to call me in 2 days for her laboratory results.  Patient is tolerating the teriflunomide very well.  Patient feels a lot better on this medication compared to Rebif.  This is also noticeable from her spouse.  Patient is having some paresthesias of the lower extremities mostly bilateral balls of feet since September.  Patient also with occasional symptoms in the right hand.  Possibly side effect of the Aubagio.  Features of neuropathy can be present as a side effect.  Other option would be new lesion within the thoracic cord.  Will update MRI T-spine with and without contrast.  Reviewed prior MRI T-spine with evidence of prior thoracic lesions.  Contrast will be helpful to exclude any new pathology.  Patient feels much better with this IMD medication and would prefer to tolerate the sensory symptoms in order to keep staying on the medication.  Question if the window between Rebif to Aubagio also gave opportunity for new lesion.  MRI T-spine will be helpful for further clarification.  At this juncture we will continue  "with generic Aubagio.  Patient to call 2 days after blood work she will be going today for her updated LFTs.  Patient aware to go monthly to continue to monitor for any risk for liver toxicity.           Mild cognitive impairment  Patient remains on Aricept.  No bradycardia.  Patient tolerates med well  No issues with safety in the home.  Patient is overdue for vitamin B12 recheck on level.  Patient has had a total of 3 IM injections to date.  Last injection was in June   patient seeing PCP today for further direction.             History of Present Illness   HPI  Pt here today with spouse for neuro followup. Pt last seen in March 19 2024.  Per my last note \"Since last visit, overall pt doing well. No new MS sxs. No falls or trips. No loc. No sz. No change in vision. No diplopia. No change in bowel or bladder. Pt notes she his following closely with gyn due to abn genetic testing in relation to sister's history of breast cancer. Pt informed she is BRCA 2 positive. Pt notes she is following closely for colonoscopy and mamograms. Pcp also helpful. Pt is currently on rebif. Pt has been on long standing med with relative stability. Pt notes she is tired of injection therapy and feels like she would like to be on oral med. Pt is interested in aubagio or generic. Pt also notes spouse will likely be retiring at some point and would like to get change in med before care home time. Rev with pt safety and efficacy profile of aubagio. No plans for conceiving. Pt almost thru menopause. Pt pt aware of teratogenicity of med and also risk for liver toxicity. Pt also needs quant gold testing. Pt is also very overdue for her cbc and hepatic panel for her rebif. Reminded of importance of labs for rebif as ifn but also for first 6 months of monthly lab draws for aubagio once we switch. Strongly rec for pt to look at current insurance coverage for aubagio before changing as unsure if any cheaper for them. Will reach out to our nursing " "team and SW team to see if they can help with insurance coverage costs or grants that may be needed. Exam stable. No ocrevus as option due to female cancer family history. Pt denies any recent hospitalizations or infections. Pt will get labs this week. Office to follow up re aubagio option.\"  Patient here today with spouse for neuro follow-up.  Patient last seen in March.  Since last visit, no recent hospitalizations.  No recent infections.  No recent UTIs.  Patient notes no falls or trips.  No change in bowel or bladder.  No change with vision.  Patient had last updated labs on May 23, 2024.  White count 5.98, ANC 3.40, ALC 1.90 quant gold was negative.  AST 33, ALT 38.  Studies reviewed in detail with patient.  Patient has stopped Reeb F since our last visit to avoid doing further injections.  Patient is now on teriflunomide daily.  Patient is feeling much better with the use of teriflunomide.  Patient's spouse also notes improvement in mood as well as energy.  Patient herself feels dramatically better with the oral medication.  Patient has noted however tingling in both feet since September.  This predominantly in the balls of the feet and occasionally in the right hand.  I did review with patient that this could be a side effect of the teriflunomide.  This is all sensory symptoms.  No weakness or foot drop.  No change in bowel or bladder.  However given history of MS and change over from Rebif to teriflunomide, also need to exclude a new thoracic lesion in the transition of IMD medication.  Recommended patient to have an MRI of the thoracic spine with and without contrast.  Patient is also very overdue for CBC differential and LFTs.  Patient had been in [place] and is now back in town.  Patient will go today with her spouse to get her blood work done.  Patient is to call me in 2 days to ensure that LFTs remain within normal limits.  Patient was reminded to do monthly serial CBC with differential and LFTs.  No " abdominal pain.  Patient would like to stay on the Aubagio and tolerate the paresthesias rather than trying a different medication or going back on Reeb F.  Patient feels much better overall with this medication.  Patient is continue to follow-up with gynecology.  Patient with known history of BRCA2 positivity.  Patient also follows up with her PCP.  Patient with some increased stress since last visit due to the passing of her cat.  Patient has good family support.  In addition, patient had prior vitamin B12 level in March 2024.  Patient was told that the level was low normal at 289.  Patient recommended to follow-up with her PCP.  Patient has had a total of 3 monthly IM vitamin B12 injections.  Patient had 1 in the office at the PCP office and then 2 by her spouse.  Patient is going today to follow-up with her PCP for further recommendations regarding follow-up blood work as well as additional vitamin B12 treatment.  I also placed order for vitamin B12 level since patient was going in between both appointments today to help expedite this value.  Review of Systems   Constitutional:  Negative for appetite change, fatigue and fever.   HENT: Negative.  Negative for hearing loss, tinnitus, trouble swallowing and voice change.    Eyes: Negative.  Negative for photophobia, pain and visual disturbance.   Respiratory: Negative.  Negative for shortness of breath.    Cardiovascular: Negative.  Negative for palpitations.   Gastrointestinal: Negative.  Negative for nausea and vomiting.   Endocrine: Negative.  Negative for cold intolerance.   Genitourinary: Negative.  Negative for dysuria, frequency and urgency.   Musculoskeletal:  Negative for back pain, gait problem, myalgias, neck pain and neck stiffness.   Skin: Negative.  Negative for rash.   Allergic/Immunologic: Negative.    Neurological: Negative.  Negative for dizziness, tremors, seizures, syncope, facial asymmetry, speech difficulty, weakness, light-headedness,  "numbness and headaches.   Hematological: Negative.  Does not bruise/bleed easily.   Psychiatric/Behavioral: Negative.  Negative for confusion, hallucinations and sleep disturbance.    All other systems reviewed and are negative.  Switched to pill from injection  then around 9/20 letitia got numb/ting on bottom of both feet an a bit on R hand. More stress then before. More energy with pill in the morning. No new issues to address.  I have personally reviewed the MA's review of systems and made changes as necessary.      Objective     /82   Pulse 86   Temp (!) 97.2 °F (36.2 °C)   Ht 5' 10\" (1.778 m)   Wt 77.1 kg (170 lb)   BMI 24.39 kg/m²     Physical Exam  Constitutional:       General: She is not in acute distress.     Appearance: She is not ill-appearing.   Musculoskeletal:      Right lower leg: No edema.      Left lower leg: No edema.   Neurological:      Mental Status: She is alert and oriented to person, place, and time.      Cranial Nerves: Cranial nerves 2-12 are intact.      Motor: Motor strength is normal.     Coordination: Finger-Nose-Finger Test and Heel to Shin Test normal.      Gait: Gait is intact.      Deep Tendon Reflexes:      Reflex Scores:       Tricep reflexes are 2+ on the right side and 2+ on the left side.       Bicep reflexes are 2+ on the right side and 2+ on the left side.       Brachioradialis reflexes are 2+ on the right side and 2+ on the left side.       Patellar reflexes are 2+ on the right side and 2+ on the left side.       Achilles reflexes are 2+ on the right side and 2+ on the left side.  Psychiatric:         Speech: Speech normal.       Neurologic Exam     Mental Status   Oriented to person, place, and time.   Speech: speech is normal     Cranial Nerves   Cranial nerves II through XII intact.     Motor Exam   Muscle bulk: normal  Overall muscle tone: normal    Strength   Strength 5/5 throughout.     Sensory Exam   Vibration normal.     Gait, Coordination, and Reflexes "     Gait  Gait: normal    Coordination   Finger to nose coordination: normal  Heel to shin coordination: normal    Tremor   Resting tremor: absent  Intention tremor: absent    Reflexes   Right brachioradialis: 2+  Left brachioradialis: 2+  Right biceps: 2+  Left biceps: 2+  Right triceps: 2+  Left triceps: 2+  Right patellar: 2+  Left patellar: 2+  Right achilles: 2+  Left achilles: 2+  Right : 2+  Left : 2+      Results/Data:  I have reviewed the results and images from the mri t spine 2014 in detail with the patient.

## 2024-10-01 NOTE — ASSESSMENT & PLAN NOTE
Patient here today for neuro follow-up.  Patient here with spouse.  Last seen for MS follow-up on March 19, 2024.  Patient had been in [place] for the past couple months.  Patient also had stressor with death of cat since our last visit.  Since last visit, patient has stopped her Rebif.  She is now on teriflunomide.  Patient started teriflunomide at the end of May.  Blood work in May was normal with CBC with differential white count 5.98, ANC 3.4, ALC 1.9.  Quant gold negative  AST 33, ALT 38.  Reviewed with patient need to have monthly CBC differential and LFTs while on teriflunomide.  Patient will go today to have her outstanding labs.  Patient to call me in 2 days for her laboratory results.  Patient is tolerating the teriflunomide very well.  Patient feels a lot better on this medication compared to Rebif.  This is also noticeable from her spouse.  Patient is having some paresthesias of the lower extremities mostly bilateral balls of feet since September.  Patient also with occasional symptoms in the right hand.  Possibly side effect of the Aubagio.  Features of neuropathy can be present as a side effect.  Other option would be new lesion within the thoracic cord.  Will update MRI T-spine with and without contrast.  Reviewed prior MRI T-spine with evidence of prior thoracic lesions.  Contrast will be helpful to exclude any new pathology.  Patient feels much better with this IMD medication and would prefer to tolerate the sensory symptoms in order to keep staying on the medication.  Question if the window between Rebif to Aubagio also gave opportunity for new lesion.  MRI T-spine will be helpful for further clarification.  At this juncture we will continue with generic Aubagio.  Patient to call 2 days after blood work she will be going today for her updated LFTs.  Patient aware to go monthly to continue to monitor for any risk for liver toxicity.

## 2024-10-01 NOTE — ASSESSMENT & PLAN NOTE
Patient remains on Aricept.  No bradycardia.  Patient tolerates med well  No issues with safety in the home.  Patient is overdue for vitamin B12 recheck on level.  Patient has had a total of 3 IM injections to date.  Last injection was in June   patient seeing PCP today for further direction.

## 2024-10-02 ENCOUNTER — TELEPHONE (OUTPATIENT)
Age: 54
End: 2024-10-02

## 2024-10-02 DIAGNOSIS — B00.9 HSV INFECTION: ICD-10-CM

## 2024-10-02 RX ORDER — VALACYCLOVIR HYDROCHLORIDE 1 G/1
1000 TABLET, FILM COATED ORAL DAILY
Qty: 30 TABLET | Refills: 3 | Status: SHIPPED | OUTPATIENT
Start: 2024-10-02 | End: 2024-11-01

## 2024-10-02 NOTE — TELEPHONE ENCOUNTER
Message  Received: Today  Niki Phipps MD  P Neurology Multiple Sclerosis Team 3  Let pt know hepatic panel normal.  Please have pt continue to get labs monthly.  Rx in emr. Gfr good too for renal function.

## 2024-10-02 NOTE — TELEPHONE ENCOUNTER
----- Message from Niki Phipps MD sent at 10/1/2024  3:34 PM EDT -----  Let pt know cbc diff ok.  Vit b12 still low at 283.  No significant change from 6 months ago.  Please have pt follow up with pcp.   Send copy of labs to pcp. Left pt know lfts still pending. Call in 2 days for those results.

## 2024-10-02 NOTE — TELEPHONE ENCOUNTER
Please make sure pt is taking vit b12 replacement as this can affect memory.   Let pt know lfts good.

## 2024-10-02 NOTE — TELEPHONE ENCOUNTER
Spoke w/pt's spouse Desmond (on consent form). Advised him of results and recommendations below. He verbalized understanding.     Labs sent to PCP.     Pt saw PCP yesterday. They are going to wait one more month with regard to Vitamin B12 results to decide game plan.     Dr. Moise plascencia

## 2024-10-03 DIAGNOSIS — G35 MULTIPLE SCLEROSIS (HCC): ICD-10-CM

## 2024-10-03 RX ORDER — TERIFLUNOMIDE 14 MG/1
TABLET, FILM COATED ORAL
Qty: 30 TABLET | Refills: 3 | Status: SHIPPED | OUTPATIENT
Start: 2024-10-03

## 2024-10-03 NOTE — TELEPHONE ENCOUNTER
Reason for call:   [x] Refill   [] Prior Auth  [] Other:     Office:   [] PCP/Provider -   [x] Specialty/Provider - neuro    Medication: Teriflunomide 14 MG TABS     Dose/Frequency: Take 1 tab by mouth daily     Quantity: 30    Pharmacy: [id number] - [place], [place]     Does the patient have enough for 3 days?   [x] Yes   [] No - Send as HP to POD

## 2024-10-08 NOTE — PROGRESS NOTES
Patient ID: Genesis Castro is a 54 y.o. female.    HPI: 54 y.o.female presenting with  a 3 day history of sore throat, nasal congestion, ear pain,pnd and cough.  Pt denies any fever, chills, or body aches.      SUBJECTIVE    Family History   Problem Relation Age of Onset    Addiction problem Father         alcohol    Liver disease Father     Heart disease Father     Hypertension Father     Colon cancer Maternal Aunt     Diabetes Maternal Aunt      Social History     Socioeconomic History    Marital status: /Civil Union     Spouse name: Not on file    Number of children: Not on file    Years of education: Not on file    Highest education level: Not on file   Occupational History    Not on file   Tobacco Use    Smoking status: Never     Passive exposure: Past    Smokeless tobacco: Never   Vaping Use    Vaping status: Never Used   Substance and Sexual Activity    Alcohol use: Yes     Comment: occassionally    Drug use: No    Sexual activity: Not on file   Other Topics Concern    Not on file   Social History Narrative    Caffeine use    Currently      Social Determinants of Health     Financial Resource Strain: Not on file   Food Insecurity: Not on file   Transportation Needs: Not on file   Physical Activity: Not on file   Stress: Not on file   Social Connections: Not on file   Intimate Partner Violence: Not on file   Housing Stability: Not on file     Past Medical History:   Diagnosis Date    Multiple sclerosis (HCC)     Neurogenic bladder      Past Surgical History:   Procedure Laterality Date    UT COLONOSCOPY FLX DX W/COLLJ SPEC WHEN PFRMD N/A 6/8/2018    Procedure: COLONOSCOPY;  Surgeon: Karla Petit MD;  Location: [facility];  Service: Gastroenterology    WISDOM TOOTH EXTRACTION       Allergies   Allergen Reactions    Clindamycin     Penicillins        Current Outpatient Medications:     Ascorbic Acid (VITAMIN C) 100 MG tablet, Take 100 mg by mouth daily, Disp: , Rfl:     benzonatate (TESSALON)  200 MG capsule, Take 1 capsule (200 mg total) by mouth 3 (three) times a day as needed for cough, Disp: 30 capsule, Rfl: 0    Cholecalciferol (VITAMIN D PO), Take by mouth, Disp: , Rfl:     Cyanocobalamin (VITAMIN B-12 PO), Take by mouth, Disp: , Rfl:     cyanocobalamin 1,000 mcg/mL, Inject 1 mL (1,000 mcg total) into a muscle every 30 (thirty) days, Disp: 10 mL, Rfl: 0    donepezil (ARICEPT) 5 mg tablet, TAKE 1 TABLET AT BEDTIME, Disp: 90 tablet, Rfl: 3    levonorgestrel-ethinyl estradiol (NORDETTE) 0.15-30 MG-MCG per tablet, Take by mouth, Disp: , Rfl:     Omega-3 Fatty Acids (FISH OIL) 1200 MG CAPS, Take by mouth, Disp: , Rfl:     Syringe 1 ML MISC, Use every 30 (thirty) days, Disp: 25 each, Rfl: 0    lubiprostone (AMITIZA) 24 mcg capsule, Take 1 capsule (24 mcg total) by mouth 2 (two) times a day with meals (Patient not taking: Reported on 10/1/2024), Disp: 60 capsule, Rfl: 6    nitrofurantoin (MACROBID) 100 mg capsule, Take 1 capsule (100 mg total) by mouth daily as needed (one tablet as needed after intercourse) (Patient not taking: Reported on 10/1/2024), Disp: 90 capsule, Rfl: 3    predniSONE 10 mg tablet, 3 tabs po bid x2 days, then 2 tabs po bid x2 days, then 1 tab bid x2 days, then 1 daily until done. (Patient not taking: Reported on 10/1/2024), Disp: 26 tablet, Rfl: 0    Teriflunomide 14 MG TABS, Take 1 tab by mouth daily, Disp: 30 tablet, Rfl: 3    valACYclovir (VALTREX) 1,000 mg tablet, Take 1 tablet (1,000 mg total) by mouth daily, Disp: 30 tablet, Rfl: 3    Review of Systems  Constitutional:     Denies fever, chills ,fatigue ,weakness ,weight loss, weight gain     ENT: Denies loss of hearing ,nosebleed, nasal discharge ,hoarseness; but admits to nasal congestion , sore throat and ear pain.    Pulmonary: Denies shortness of breath ,dyspnea on exertion, orthopnea ; but admits to cough and pnd  Cardiovascular:  Denies bradycardia , tachycardia  ,palpations, lower extremity edema leg,  "claudication  Breast:  Denies new or changing breast lumps ,nipple discharge ,nipple changes  Abdomen:  Denies abdominal pain , anorexia , indigestion, nausea, vomiting, constipation, diarrhea  Musculoskeletal: Denies myalgias, arthralgias, joint swelling, joint stiffness , limb pain, limb swelling  Lymph: + swollen glands  Gu: Denies polyuria or dysuria  Skin: Denies skin rash, skin lesion, skin wound, itching, dry skin  Neuro: Denies headache, numbness, tingling, confusion, loss of consciousness, dizziness, vertigo  Psychiatric: Denies feelings of depression, suicidal ideation, anxiety, sleep disturbances    OBJECTIVE  /86   Pulse 83   Temp 97.5 °F (36.4 °C)   Ht 5' 10\" (1.778 m)   Wt 76.7 kg (169 lb)   SpO2 100%   BMI 24.25 kg/m²   Constitutional:   NAD, well appearing and well nourished     ENT:   Conjunctiva and lids: no injection, edema, or discharge    Pupils and iris: MARGARITA bilaterally  External inspection of ears and nose: normal without deformities or discharge.     Otoscopic exam: Canals patent without erythema, tm dull and erythematous, effusions bilaterally   Nasal mucosa, septum and turbinates: + turbinate injection, nasal discharge         Oropharynx:  Moist mucosa, normal tongue and tonsils without lesions.+ erythema and injection of posterior pharynx with pnd    Pulmonary:Respiratory effort normal rate and rhythm, no increased work of breathing. Auscultation of lungs:  Clear bilaterally with no adventitious breath sounds     Cardiovascular: regular rate and rhythm, S1 and S2, no murmur, no edema and/or varicosities of LE     Abdomen: Soft and nontender with + bowel sounds  No heptomegaly or splenomegaly     Gu: no suprapubic tenderness or CVA tenderness  Lymphatic: + anterior  cervical lymphadenopathy      Musculoskeletal:  Gait and station: Normal gait      Digits and nails normal without clubbing or cyanosis      Inspection/palpation of joints, bones, and muscles:  No joint tenderness, " swelling, full active and passive range of motion       Skin: Normal skin turgor and no rashes      Neuro:    Normal reflexes  Pych:   alert and oriented to person, place and time     normal mood and affect      Assessment/Plan:Diagnoses and all orders for this visit:    Upper respiratory tract infection, unspecified type  -     azithromycin (ZITHROMAX) 250 mg tablet; Take 2 tablets today then 1 tablet daily x 4 days  -     benzonatate (TESSALON) 200 MG capsule; Take 1 capsule (200 mg total) by mouth 3 (three) times a day as needed for cough    Need for influenza vaccination  -     influenza vaccine, recombinant, PF, 0.5 mL IM (Flublok)        Reviewed with patient plan to treat with above plan.    Patient instructed to call in 72 hours if not feeling better or if symptoms worsen

## 2024-10-18 ENCOUNTER — HOSPITAL ENCOUNTER (OUTPATIENT)
Dept: MRI IMAGING | Facility: HOSPITAL | Age: 54
End: 2024-10-18
Payer: MEDICARE

## 2024-10-18 DIAGNOSIS — G35 MULTIPLE SCLEROSIS (HCC): ICD-10-CM

## 2024-10-18 PROCEDURE — A9585 GADOBUTROL INJECTION: HCPCS | Performed by: RADIOLOGY

## 2024-10-18 PROCEDURE — 72157 MRI CHEST SPINE W/O & W/DYE: CPT

## 2024-10-18 RX ORDER — GADOBUTROL 604.72 MG/ML
7 INJECTION INTRAVENOUS
Status: COMPLETED | OUTPATIENT
Start: 2024-10-18 | End: 2024-10-18

## 2024-10-18 RX ADMIN — GADOBUTROL 7 ML: 604.72 INJECTION INTRAVENOUS at 11:46

## 2024-10-21 ENCOUNTER — TELEPHONE (OUTPATIENT)
Age: 54
End: 2024-10-21

## 2024-11-04 ENCOUNTER — TELEPHONE (OUTPATIENT)
Age: 54
End: 2024-11-04

## 2024-11-04 ENCOUNTER — APPOINTMENT (OUTPATIENT)
Dept: LAB | Facility: CLINIC | Age: 54
End: 2024-11-04
Payer: COMMERCIAL

## 2024-11-04 DIAGNOSIS — G35 MULTIPLE SCLEROSIS (HCC): ICD-10-CM

## 2024-11-04 LAB
ALBUMIN SERPL BCG-MCNC: 3.9 G/DL (ref 3.5–5)
ALP SERPL-CCNC: 60 U/L (ref 34–104)
ALT SERPL W P-5'-P-CCNC: 20 U/L (ref 7–52)
AST SERPL W P-5'-P-CCNC: 25 U/L (ref 13–39)
BASOPHILS # BLD AUTO: 0.06 THOUSANDS/ΜL (ref 0–0.1)
BASOPHILS NFR BLD AUTO: 1 % (ref 0–1)
BILIRUB DIRECT SERPL-MCNC: 0.09 MG/DL (ref 0–0.2)
BILIRUB SERPL-MCNC: 0.45 MG/DL (ref 0.2–1)
EOSINOPHIL # BLD AUTO: 0.08 THOUSAND/ΜL (ref 0–0.61)
EOSINOPHIL NFR BLD AUTO: 1 % (ref 0–6)
ERYTHROCYTE [DISTWIDTH] IN BLOOD BY AUTOMATED COUNT: 13.2 % (ref 11.6–15.1)
HCT VFR BLD AUTO: 46.4 % (ref 34.8–46.1)
HGB BLD-MCNC: 14.6 G/DL (ref 11.5–15.4)
IMM GRANULOCYTES # BLD AUTO: 0.01 THOUSAND/UL (ref 0–0.2)
IMM GRANULOCYTES NFR BLD AUTO: 0 % (ref 0–2)
LYMPHOCYTES # BLD AUTO: 1.9 THOUSANDS/ΜL (ref 0.6–4.47)
LYMPHOCYTES NFR BLD AUTO: 29 % (ref 14–44)
MCH RBC QN AUTO: 31 PG (ref 26.8–34.3)
MCHC RBC AUTO-ENTMCNC: 31.5 G/DL (ref 31.4–37.4)
MCV RBC AUTO: 99 FL (ref 82–98)
MONOCYTES # BLD AUTO: 0.45 THOUSAND/ΜL (ref 0.17–1.22)
MONOCYTES NFR BLD AUTO: 7 % (ref 4–12)
NEUTROPHILS # BLD AUTO: 4.06 THOUSANDS/ΜL (ref 1.85–7.62)
NEUTS SEG NFR BLD AUTO: 62 % (ref 43–75)
NRBC BLD AUTO-RTO: 0 /100 WBCS
PLATELET # BLD AUTO: 202 THOUSANDS/UL (ref 149–390)
PMV BLD AUTO: 11.5 FL (ref 8.9–12.7)
PROT SERPL-MCNC: 7.1 G/DL (ref 6.4–8.4)
RBC # BLD AUTO: 4.71 MILLION/UL (ref 3.81–5.12)
WBC # BLD AUTO: 6.56 THOUSAND/UL (ref 4.31–10.16)

## 2024-11-04 PROCEDURE — 85025 COMPLETE CBC W/AUTO DIFF WBC: CPT

## 2024-11-04 PROCEDURE — 36415 COLL VENOUS BLD VENIPUNCTURE: CPT

## 2024-11-04 PROCEDURE — 80076 HEPATIC FUNCTION PANEL: CPT

## 2024-11-04 NOTE — TELEPHONE ENCOUNTER
----- Message from Niki Phipps MD sent at 11/4/2024  2:00 PM EST -----  Let pt know wbc ok and alc and anc ok. Still with eelvated hct unrelated to MS.  Send copy to pcp. Pt to follow up with pcp

## 2024-11-04 NOTE — TELEPHONE ENCOUNTER
Spoke w/pt's  Desmond (on consent form). Advised him of results and recommendations below. He verbalized understanding. Stated pt has f/u appt next week w/PCP.     Labs sent to PCP.     11/12/24 appt w/Dr. Gilbert plascencia

## 2024-11-05 NOTE — TELEPHONE ENCOUNTER
Message  Received: Yesterday  Niki Phipps MD  P Neurology Multiple Sclerosis Team 3  Let pt know lfts ok. Cont with monthly labs    --------------------------------------------------    Left detailed msg (per consent in chart) regarding results and recommendations below.

## 2024-11-12 ENCOUNTER — OFFICE VISIT (OUTPATIENT)
Dept: FAMILY MEDICINE CLINIC | Facility: CLINIC | Age: 54
End: 2024-11-12
Payer: COMMERCIAL

## 2024-11-12 VITALS
TEMPERATURE: 97.5 F | BODY MASS INDEX: 24.28 KG/M2 | SYSTOLIC BLOOD PRESSURE: 122 MMHG | OXYGEN SATURATION: 100 % | HEART RATE: 84 BPM | WEIGHT: 169.6 LBS | HEIGHT: 70 IN | DIASTOLIC BLOOD PRESSURE: 80 MMHG

## 2024-11-12 DIAGNOSIS — G35 MULTIPLE SCLEROSIS (HCC): ICD-10-CM

## 2024-11-12 DIAGNOSIS — N31.9 NEUROGENIC BLADDER: ICD-10-CM

## 2024-11-12 DIAGNOSIS — Z00.00 MEDICARE ANNUAL WELLNESS VISIT, SUBSEQUENT: Primary | ICD-10-CM

## 2024-11-12 DIAGNOSIS — Z23 ENCOUNTER FOR IMMUNIZATION: ICD-10-CM

## 2024-11-12 DIAGNOSIS — G31.84 MILD COGNITIVE IMPAIRMENT: ICD-10-CM

## 2024-11-12 PROCEDURE — 99213 OFFICE O/P EST LOW 20 MIN: CPT | Performed by: FAMILY MEDICINE

## 2024-11-12 PROCEDURE — 90677 PCV20 VACCINE IM: CPT | Performed by: FAMILY MEDICINE

## 2024-11-12 PROCEDURE — G0439 PPPS, SUBSEQ VISIT: HCPCS | Performed by: FAMILY MEDICINE

## 2024-11-12 PROCEDURE — 90471 IMMUNIZATION ADMIN: CPT | Performed by: FAMILY MEDICINE

## 2024-11-12 NOTE — PATIENT INSTRUCTIONS
Medicare Preventive Visit Patient Instructions  Thank you for completing your Welcome to Medicare Visit or Medicare Annual Wellness Visit today. Your next wellness visit will be due in one year (11/13/2025).  The screening/preventive services that you may require over the next 5-10 years are detailed below. Some tests may not apply to you based off risk factors and/or age. Screening tests ordered at today's visit but not completed yet may show as past due. Also, please note that scanned in results may not display below.  Preventive Screenings:  Service Recommendations Previous Testing/Comments   Colorectal Cancer Screening  * Colonoscopy    * Fecal Occult Blood Test (FOBT)/Fecal Immunochemical Test (FIT)  * Fecal DNA/Cologuard Test  * Flexible Sigmoidoscopy Age: 45-75 years old   Colonoscopy: every 10 years (may be performed more frequently if at higher risk)  OR  FOBT/FIT: every 1 year  OR  Cologuard: every 3 years  OR  Sigmoidoscopy: every 5 years  Screening may be recommended earlier than age 45 if at higher risk for colorectal cancer. Also, an individualized decision between you and your healthcare provider will decide whether screening between the ages of 76-85 would be appropriate. Colonoscopy: 06/08/2018  FOBT/FIT: Not on file  Cologuard: Not on file  Sigmoidoscopy: Not on file          Breast Cancer Screening Age: 40+ years old  Frequency: every 1-2 years  Not required if history of left and right mastectomy Mammogram: 10/17/2024        Cervical Cancer Screening Between the ages of 21-29, pap smear recommended once every 3 years.   Between the ages of 30-65, can perform pap smear with HPV co-testing every 5 years.   Recommendations may differ for women with a history of total hysterectomy, cervical cancer, or abnormal pap smears in past. Pap Smear: Not on file        Hepatitis C Screening Once for adults born between 1945 and 1965  More frequently in patients at high risk for Hepatitis C Hep C Antibody: Not  on file        Diabetes Screening 1-2 times per year if you're at risk for diabetes or have pre-diabetes Fasting glucose: 77 mg/dL (4/8/2022)  A1C: No results in last 5 years (No results in last 5 years)      Cholesterol Screening Once every 5 years if you don't have a lipid disorder. May order more often based on risk factors. Lipid panel: 04/08/2022          Other Preventive Screenings Covered by Medicare:  Abdominal Aortic Aneurysm (AAA) Screening: covered once if your at risk. You're considered to be at risk if you have a family history of AAA.  Lung Cancer Screening: covers low dose CT scan once per year if you meet all of the following conditions: (1) Age 55-77; (2) No signs or symptoms of lung cancer; (3) Current smoker or have quit smoking within the last 15 years; (4) You have a tobacco smoking history of at least 20 pack years (packs per day multiplied by number of years you smoked); (5) You get a written order from a healthcare provider.  Glaucoma Screening: covered annually if you're considered high risk: (1) You have diabetes OR (2) Family history of glaucoma OR (3)  aged 50 and older OR (4)  American aged 65 and older  Osteoporosis Screening: covered every 2 years if you meet one of the following conditions: (1) You're estrogen deficient and at risk for osteoporosis based off medical history and other findings; (2) Have a vertebral abnormality; (3) On glucocorticoid therapy for more than 3 months; (4) Have primary hyperparathyroidism; (5) On osteoporosis medications and need to assess response to drug therapy.   Last bone density test (DXA Scan): Not on file.  HIV Screening: covered annually if you're between the age of 15-65. Also covered annually if you are younger than 15 and older than 65 with risk factors for HIV infection. For pregnant patients, it is covered up to 3 times per pregnancy.    Immunizations:  Immunization Recommendations   Influenza Vaccine Annual influenza  vaccination during flu season is recommended for all persons aged >= 6 months who do not have contraindications   Pneumococcal Vaccine   * Pneumococcal conjugate vaccine = PCV13 (Prevnar 13), PCV15 (Vaxneuvance), PCV20 (Prevnar 20)  * Pneumococcal polysaccharide vaccine = PPSV23 (Pneumovax) Adults 19-65 yo with certain risk factors or if 65+ yo  If never received any pneumonia vaccine: recommend Prevnar 20 (PCV20)  Give PCV20 if previously received 1 dose of PCV13 or PPSV23   Hepatitis B Vaccine 3 dose series if at intermediate or high risk (ex: diabetes, end stage renal disease, liver disease)   Respiratory syncytial virus (RSV) Vaccine - COVERED BY MEDICARE PART D  * RSVPreF3 (Arexvy) CDC recommends that adults 60 years of age and older may receive a single dose of RSV vaccine using shared clinical decision-making (SCDM)   Tetanus (Td) Vaccine - COST NOT COVERED BY MEDICARE PART B Following completion of primary series, a booster dose should be given every 10 years to maintain immunity against tetanus. Td may also be given as tetanus wound prophylaxis.   Tdap Vaccine - COST NOT COVERED BY MEDICARE PART B Recommended at least once for all adults. For pregnant patients, recommended with each pregnancy.   Shingles Vaccine (Shingrix) - COST NOT COVERED BY MEDICARE PART B  2 shot series recommended in those 19 years and older who have or will have weakened immune systems or those 50 years and older     Health Maintenance Due:      Topic Date Due   • Hepatitis C Screening  Never done   • HIV Screening  Never done   • Cervical Cancer Screening  Never done   • Breast Cancer Screening: Mammogram  10/17/2025   • Colorectal Cancer Screening  06/08/2028     Immunizations Due:      Topic Date Due   • DTaP,Tdap,and Td Vaccines (1 - Tdap) Never done     Advance Directives   What are advance directives?  Advance directives are legal documents that state your wishes and plans for medical care. These plans are made ahead of time in  case you lose your ability to make decisions for yourself. Advance directives can apply to any medical decision, such as the treatments you want, and if you want to donate organs.   What are the types of advance directives?  There are many types of advance directives, and each state has rules about how to use them. You may choose a combination of any of the following:  Living will:  This is a written record of the treatment you want. You can also choose which treatments you do not want, which to limit, and which to stop at a certain time. This includes surgery, medicine, IV fluid, and tube feedings.   Durable power of  for healthcare (DPAHC):  This is a written record that states who you want to make healthcare choices for you when you are unable to make them for yourself. This person, called a proxy, is usually a family member or a friend. You may choose more than 1 proxy.  Do not resuscitate (DNR) order:  A DNR order is used in case your heart stops beating or you stop breathing. It is a request not to have certain forms of treatment, such as CPR. A DNR order may be included in other types of advance directives.  Medical directive:  This covers the care that you want if you are in a coma, near death, or unable to make decisions for yourself. You can list the treatments you want for each condition. Treatment may include pain medicine, surgery, blood transfusions, dialysis, IV or tube feedings, and a ventilator (breathing machine).  Values history:  This document has questions about your views, beliefs, and how you feel and think about life. This information can help others choose the care that you would choose.  Why are advance directives important?  An advance directive helps you control your care. Although spoken wishes may be used, it is better to have your wishes written down. Spoken wishes can be misunderstood, or not followed. Treatments may be given even if you do not want them. An advance directive  may make it easier for your family to make difficult choices about your care.       © Copyright [employer] 2018 Information is for End User's use only and may not be sold, redistributed or otherwise used for commercial purposes. All illustrations and images included in CareNotes® are the copyrighted property of A.D.A.M., Inc. or [employer]

## 2024-11-12 NOTE — PROGRESS NOTES
Ambulatory Visit  Name: Genesis Castro      : 1970      MRN: [id number]  Encounter Provider: Marylou Hunt DO  Encounter Date: 2024   Encounter department: [facility]  Medicare annual wellness visit, subsequent         Multiple sclerosis (HCC)  Continue therapy as per neurology's management       Mild cognitive impairment  Stable on Aricept therapy.       Neurogenic bladder  Continue with preventative Macrobid therapy       Encounter for immunization    Orders:    Pneumococcal Conjugate Vaccine 20-valent (Pcv20)       Preventive health issues were discussed with patient, and age appropriate screening tests were ordered as noted in patient's After Visit Summary. Personalized health advice and appropriate referrals for health education or preventive services given if needed, as noted in patient's After Visit Summary.    History of Present Illness     HPI   Patient Care Team:  Marylou Hunt DO as PCP - General Karla Petit MD as Endoscopist    Review of Systems   Constitutional:  Negative for appetite change, chills and fever.   HENT:  Negative for ear pain, facial swelling, rhinorrhea, sinus pain, sore throat and trouble swallowing.    Eyes:  Negative for discharge and redness.   Respiratory:  Negative for chest tightness, shortness of breath and wheezing.    Cardiovascular:  Negative for chest pain and palpitations.   Gastrointestinal:  Negative for abdominal pain, diarrhea, nausea and vomiting.   Endocrine: Negative for polyuria.   Genitourinary:  Negative for dysuria and urgency.   Musculoskeletal:  Positive for gait problem. Negative for arthralgias and back pain.   Skin:  Negative for rash.   Neurological:  Negative for dizziness, weakness and headaches.   Hematological:  Negative for adenopathy.   Psychiatric/Behavioral:  Positive for decreased concentration. Negative for behavioral problems, confusion and sleep disturbance.          Positive short-term memory impairment   All other systems reviewed and are negative.    Medical History Reviewed by provider this encounter:       Annual Wellness Visit Questionnaire   Genesis is here for her Subsequent Wellness visit. Last Medicare Wellness visit information reviewed, patient interviewed, no change since last AWV.     Health Risk Assessment:   Patient rates overall health as good. Patient feels that their physical health rating is same. Patient is very satisfied with their life. Eyesight was rated as same. Hearing was rated as same. Patient feels that their emotional and mental health rating is same. Patients states they are never, rarely angry. Patient states they are always unusually tired/fatigued. Pain experienced in the last 7 days has been none. Patient states that she has experienced no weight loss or gain in last 6 months.     Depression Screening:   PHQ-2 Score: 0      Fall Risk Screening:   In the past year, patient has experienced: no history of falling in past year      Urinary Incontinence Screening:   Patient has leaked urine accidently in the last six months.     Home Safety:  Patient does not have trouble with stairs inside or outside of their home. Patient has working smoke alarms and has working carbon monoxide detector. Home safety hazards include: none.     Nutrition:   Current diet is Regular.     Medications:   Patient is currently taking over-the-counter supplements. OTC medications include: see medication list. Patient is able to manage medications.     Activities of Daily Living (ADLs)/Instrumental Activities of Daily Living (IADLs):   Walk and transfer into and out of bed and chair?: Yes  Dress and groom yourself?: Yes    Bathe or shower yourself?: Yes    Feed yourself? Yes  Do your laundry/housekeeping?: Yes  Manage your money, pay your bills and track your expenses?: Yes  Make your own meals?: Yes    Do your own shopping?: Yes    Previous Hospitalizations:   Any  "hospitalizations or ED visits within the last 12 months?: No      Advance Care Planning:   Living will: Yes    Durable POA for healthcare: Yes    Advanced directive: Yes    Advanced directive counseling given: Yes    ACP document given: Yes    Patient declined ACP directive: No    End of Life Decisions reviewed with patient: Yes    Provider agrees with end of life decisions: Yes      Cognitive Screening:   Provider or family/friend/caregiver concerned regarding cognition?: No    PREVENTIVE SCREENINGS      Cardiovascular Screening:    General: Screening Current      Diabetes Screening:     General: Screening Current      Colorectal Cancer Screening:     General: Screening Current      Breast Cancer Screening:     General: Screening Current      Osteoporosis Screening:    General: Screening Current      Abdominal Aortic Aneurysm (AAA) Screening:        General: Screening Current and Screening Not Indicated      Lung Cancer Screening:     General: Screening Not Indicated      Hepatitis C Screening:    General: Screening Current    Screening, Brief Intervention, and Referral to Treatment (SBIRT)    Screening    Typical number of drinks in a week: 0    Single Item Drug Screening:  How often have you used an illegal drug (including marijuana) or a prescription medication for non-medical reasons in the past year? never    Single Item Drug Screen Score: 0  Interpretation: Negative screen for possible drug use disorder       No results found.    Objective     Pulse 84   Temp 97.5 °F (36.4 °C)   Ht 5' 10\" (1.778 m)   Wt 76.9 kg (169 lb 9.6 oz)   SpO2 100%   BMI 24.34 kg/m²     Physical Exam  Vitals and nursing note reviewed.   Constitutional:       General: She is not in acute distress.     Appearance: Normal appearance. She is well-developed. She is not ill-appearing or diaphoretic.   HENT:      Head: Normocephalic and atraumatic.      Right Ear: Tympanic membrane, ear canal and external ear normal.      Left Ear: " Tympanic membrane, ear canal and external ear normal.      Nose: Nose normal. No congestion or rhinorrhea.      Mouth/Throat:      Mouth: Mucous membranes are moist.      Pharynx: Oropharynx is clear. No oropharyngeal exudate or posterior oropharyngeal erythema.   Eyes:      General: No scleral icterus.        Right eye: No discharge.         Left eye: No discharge.      Extraocular Movements: Extraocular movements intact.      Conjunctiva/sclera: Conjunctivae normal.      Pupils: Pupils are equal, round, and reactive to light.   Neck:      Thyroid: No thyromegaly.      Vascular: No carotid bruit or JVD.      Trachea: No tracheal deviation.   Cardiovascular:      Rate and Rhythm: Normal rate and regular rhythm.      Pulses: Normal pulses.      Heart sounds: Normal heart sounds. No murmur heard.  Pulmonary:      Effort: Pulmonary effort is normal. No respiratory distress.      Breath sounds: Normal breath sounds. No stridor. No wheezing, rhonchi or rales.   Abdominal:      General: Abdomen is flat. Bowel sounds are normal. There is no distension.      Palpations: Abdomen is soft. There is no mass.      Tenderness: There is no abdominal tenderness. There is no guarding or rebound.   Musculoskeletal:         General: No swelling, tenderness or deformity. Normal range of motion.      Cervical back: Normal range of motion and neck supple. No rigidity.      Right lower leg: No edema.      Left lower leg: No edema.   Lymphadenopathy:      Cervical: No cervical adenopathy.   Skin:     General: Skin is warm and dry.      Capillary Refill: Capillary refill takes less than 2 seconds.      Coloration: Skin is not jaundiced.      Findings: No bruising, erythema or rash.   Neurological:      General: No focal deficit present.      Mental Status: She is alert and oriented to person, place, and time.      Cranial Nerves: No cranial nerve deficit.      Sensory: No sensory deficit.      Motor: No abnormal muscle tone.       Coordination: Coordination normal.      Gait: Gait normal.      Deep Tendon Reflexes: Reflexes are normal and symmetric. Reflexes normal.   Psychiatric:         Mood and Affect: Mood normal.         Behavior: Behavior normal.         Judgment: Judgment normal.      Comments: Positive short-term memory impairment       Administrative Statements   I have spent a total time of 20 minutes in caring for this patient on the day of the visit/encounter including Diagnostic results, Prognosis, Risks and benefits of tx options, and Instructions for management. Topics discussed with the patient / family include symptom assessment and management, medication review, medication adjustment, psychosocial support, and advanced directives.

## 2024-11-29 ENCOUNTER — TELEPHONE (OUTPATIENT)
Dept: NEUROLOGY | Facility: CLINIC | Age: 54
End: 2024-11-29

## 2024-11-29 NOTE — TELEPHONE ENCOUNTER
Spouse brought in yearly progress report that they are asking office to fill out and fax to [employer] [facility] at [phone]. Last completed November 2023. Blank copy of form scanned and attached to this encounter if ever needed.     Once completed, patient would like a copy of the form. Please call and make aware once faxed.

## 2024-12-06 NOTE — TELEPHONE ENCOUNTER
Called and spoke with spouse Desmond told him form was completed and faxed. I will mail them a copy once I have scanned into patient chart. Desmond very grateful and thanked me for handling paperwork

## 2024-12-10 ENCOUNTER — APPOINTMENT (OUTPATIENT)
Dept: LAB | Facility: CLINIC | Age: 54
End: 2024-12-10
Payer: COMMERCIAL

## 2024-12-10 DIAGNOSIS — G35 MULTIPLE SCLEROSIS (HCC): ICD-10-CM

## 2024-12-10 LAB
BASOPHILS # BLD AUTO: 0.05 THOUSANDS/ÂΜL (ref 0–0.1)
BASOPHILS NFR BLD AUTO: 1 % (ref 0–1)
EOSINOPHIL # BLD AUTO: 0.09 THOUSAND/ÂΜL (ref 0–0.61)
EOSINOPHIL NFR BLD AUTO: 1 % (ref 0–6)
ERYTHROCYTE [DISTWIDTH] IN BLOOD BY AUTOMATED COUNT: 13.8 % (ref 11.6–15.1)
HCT VFR BLD AUTO: 43.2 % (ref 34.8–46.1)
HGB BLD-MCNC: 13.7 G/DL (ref 11.5–15.4)
IMM GRANULOCYTES # BLD AUTO: 0.02 THOUSAND/UL (ref 0–0.2)
IMM GRANULOCYTES NFR BLD AUTO: 0 % (ref 0–2)
LYMPHOCYTES # BLD AUTO: 1.94 THOUSANDS/ÂΜL (ref 0.6–4.47)
LYMPHOCYTES NFR BLD AUTO: 30 % (ref 14–44)
MCH RBC QN AUTO: 30.4 PG (ref 26.8–34.3)
MCHC RBC AUTO-ENTMCNC: 31.7 G/DL (ref 31.4–37.4)
MCV RBC AUTO: 96 FL (ref 82–98)
MONOCYTES # BLD AUTO: 0.42 THOUSAND/ÂΜL (ref 0.17–1.22)
MONOCYTES NFR BLD AUTO: 7 % (ref 4–12)
NEUTROPHILS # BLD AUTO: 3.89 THOUSANDS/ÂΜL (ref 1.85–7.62)
NEUTS SEG NFR BLD AUTO: 61 % (ref 43–75)
NRBC BLD AUTO-RTO: 0 /100 WBCS
PLATELET # BLD AUTO: 232 THOUSANDS/UL (ref 149–390)
PMV BLD AUTO: 11.3 FL (ref 8.9–12.7)
RBC # BLD AUTO: 4.5 MILLION/UL (ref 3.81–5.12)
WBC # BLD AUTO: 6.41 THOUSAND/UL (ref 4.31–10.16)

## 2024-12-10 PROCEDURE — 85025 COMPLETE CBC W/AUTO DIFF WBC: CPT

## 2024-12-10 PROCEDURE — 80076 HEPATIC FUNCTION PANEL: CPT

## 2024-12-10 PROCEDURE — 36415 COLL VENOUS BLD VENIPUNCTURE: CPT

## 2024-12-11 LAB
ALBUMIN SERPL BCG-MCNC: 3.8 G/DL (ref 3.5–5)
ALP SERPL-CCNC: 51 U/L (ref 34–104)
ALT SERPL W P-5'-P-CCNC: 15 U/L (ref 7–52)
AST SERPL W P-5'-P-CCNC: 21 U/L (ref 13–39)
BILIRUB DIRECT SERPL-MCNC: 0.05 MG/DL (ref 0–0.2)
BILIRUB SERPL-MCNC: 0.36 MG/DL (ref 0.2–1)
PROT SERPL-MCNC: 6.9 G/DL (ref 6.4–8.4)

## 2025-01-17 DIAGNOSIS — G35 MULTIPLE SCLEROSIS (HCC): ICD-10-CM

## 2025-01-17 RX ORDER — TERIFLUNOMIDE 14 MG/1
1 TABLET, FILM COATED ORAL DAILY
Qty: 30 TABLET | Refills: 3 | Status: SHIPPED | OUTPATIENT
Start: 2025-01-17

## 2025-01-22 DIAGNOSIS — B00.9 HSV INFECTION: ICD-10-CM

## 2025-01-22 RX ORDER — VALACYCLOVIR HYDROCHLORIDE 1 G/1
1000 TABLET, FILM COATED ORAL DAILY
Qty: 30 TABLET | Refills: 3 | Status: SHIPPED | OUTPATIENT
Start: 2025-01-22 | End: 2025-02-21

## 2025-02-24 ENCOUNTER — APPOINTMENT (OUTPATIENT)
Dept: LAB | Facility: CLINIC | Age: 55
End: 2025-02-24
Payer: COMMERCIAL

## 2025-02-24 ENCOUNTER — RESULTS FOLLOW-UP (OUTPATIENT)
Dept: NEUROLOGY | Facility: CLINIC | Age: 55
End: 2025-02-24

## 2025-02-24 ENCOUNTER — TELEPHONE (OUTPATIENT)
Age: 55
End: 2025-02-24

## 2025-02-24 DIAGNOSIS — G35 MULTIPLE SCLEROSIS (HCC): ICD-10-CM

## 2025-02-24 NOTE — TELEPHONE ENCOUNTER
Pts  calling in to let Dr Phipps know that they missed blood work for the month of January. Last set was in December. Pt is going to complete blood work today for this Monday. Pts  wanted to know if they had messed up anything by missing a set of blood work as well as how much longer will pt need to complete blood work,.      Pts  also advise that he works night shift so if calling and no answer please leave VM.    Please assist, thank you.

## 2025-02-24 NOTE — TELEPHONE ENCOUNTER
No worries.  We got the dec labs.  Feb labs pending.  If these look ok, then they can do every other month with labs.  Feb labs still pending.  Please have  check back in 2-3 days, labs should be back by then

## 2025-02-27 ENCOUNTER — OFFICE VISIT (OUTPATIENT)
Dept: URGENT CARE | Facility: MEDICAL CENTER | Age: 55
End: 2025-02-27
Payer: MEDICARE

## 2025-02-27 VITALS
OXYGEN SATURATION: 98 % | DIASTOLIC BLOOD PRESSURE: 80 MMHG | SYSTOLIC BLOOD PRESSURE: 120 MMHG | TEMPERATURE: 98 F | HEIGHT: 70 IN | RESPIRATION RATE: 18 BRPM | HEART RATE: 98 BPM | BODY MASS INDEX: 24.77 KG/M2 | WEIGHT: 173 LBS

## 2025-02-27 DIAGNOSIS — J01.90 ACUTE NON-RECURRENT SINUSITIS, UNSPECIFIED LOCATION: Primary | ICD-10-CM

## 2025-02-27 DIAGNOSIS — R05.1 ACUTE COUGH: ICD-10-CM

## 2025-02-27 DIAGNOSIS — R09.81 NASAL CONGESTION: ICD-10-CM

## 2025-02-27 PROCEDURE — 99214 OFFICE O/P EST MOD 30 MIN: CPT | Performed by: PHYSICIAN ASSISTANT

## 2025-02-27 PROCEDURE — G0463 HOSPITAL OUTPT CLINIC VISIT: HCPCS | Performed by: PHYSICIAN ASSISTANT

## 2025-02-27 RX ORDER — AZITHROMYCIN 250 MG/1
TABLET, FILM COATED ORAL
Qty: 6 TABLET | Refills: 0 | Status: SHIPPED | OUTPATIENT
Start: 2025-02-27 | End: 2025-03-03

## 2025-02-27 NOTE — PROGRESS NOTES
[facility]        NAME: Genesis Castro is a 54 y.o. female  : 1970    MRN: [id number]  DATE: 2025  TIME: 4:05 PM    Assessment and Plan   Acute non-recurrent sinusitis, unspecified location [J01.90]  1. Acute non-recurrent sinusitis, unspecified location  azithromycin (ZITHROMAX) 250 mg tablet      2. Acute cough        3. Nasal congestion              Patient Instructions     Rx Zpak, take as directed on package.   Motrin for fever/discomfort.  Stay well hydrated and get rest.  Supportive care.  Follow up with PCP in 3-5 days.    If tests have been performed at [facility], our office will contact you with results if changes need to be made to the care plan discussed with you at the visit. [facility] can [facility] on [facility]     Chief Complaint     Chief Complaint   Patient presents with    Cold Like Symptoms     Started 2 weeks ago with head/chest cold coughing, congestion.  Has been taking Musinex, Dayquil, Robatusin with no relief.  Does have MS.,  just took covid test also and negative.         History of Present Illness       Patient is a 55 yo female who presents with over 2 weeks of sinus pressure, cough/congestion, and chills with new onset of excessive nasal drainage in the past 2-3 days. Denies fever. Denies n/v/d.        Review of Systems   Review of Systems   Constitutional:  Positive for chills. Negative for fever.   HENT:  Positive for congestion, rhinorrhea, sinus pressure and sinus pain.    Respiratory:  Positive for cough. Negative for shortness of breath and wheezing.    Cardiovascular: Negative.    Gastrointestinal: Negative.    Skin: Negative.    Neurological: Negative.          Current Medications       Current Outpatient Medications:     Ascorbic Acid (VITAMIN C) 100 MG tablet, Take 100 mg by mouth daily, Disp: , Rfl:     azithromycin (ZITHROMAX) 250 mg tablet, Take 2 tablets today then 1 tablet daily x 4 days, Disp: 6 tablet, Rfl: 0     Cholecalciferol (VITAMIN D PO), Take by mouth, Disp: , Rfl:     Cyanocobalamin (VITAMIN B-12 PO), Take by mouth, Disp: , Rfl:     donepezil (ARICEPT) 5 mg tablet, TAKE 1 TABLET AT BEDTIME, Disp: 90 tablet, Rfl: 3    levonorgestrel-ethinyl estradiol (NORDETTE) 0.15-30 MG-MCG per tablet, Take by mouth, Disp: , Rfl:     Omega-3 Fatty Acids (FISH OIL) 1200 MG CAPS, Take by mouth, Disp: , Rfl:     Teriflunomide 14 MG TABS, TAKE 1 TABLET BY MOUTH EVERY DAY, Disp: 30 tablet, Rfl: 3    benzonatate (TESSALON) 200 MG capsule, Take 1 capsule (200 mg total) by mouth 3 (three) times a day as needed for cough (Patient not taking: Reported on 11/12/2024), Disp: 30 capsule, Rfl: 0    cyanocobalamin 1,000 mcg/mL, Inject 1 mL (1,000 mcg total) into a muscle every 30 (thirty) days (Patient not taking: Reported on 11/12/2024), Disp: 10 mL, Rfl: 0    lubiprostone (AMITIZA) 24 mcg capsule, Take 1 capsule (24 mcg total) by mouth 2 (two) times a day with meals (Patient not taking: Reported on 10/1/2024), Disp: 60 capsule, Rfl: 6    nitrofurantoin (MACROBID) 100 mg capsule, Take 1 capsule (100 mg total) by mouth daily as needed (one tablet as needed after intercourse) (Patient not taking: Reported on 10/1/2024), Disp: 90 capsule, Rfl: 3    Syringe 1 ML MISC, Use every 30 (thirty) days (Patient not taking: Reported on 11/12/2024), Disp: 25 each, Rfl: 0    valACYclovir (VALTREX) 1,000 mg tablet, Take 1 tablet (1,000 mg total) by mouth daily, Disp: 30 tablet, Rfl: 3    Current Allergies     Allergies as of 02/27/2025 - Reviewed 02/27/2025   Allergen Reaction Noted    Clindamycin  01/08/2014    Penicillins  01/08/2014            The following portions of the patient's history were reviewed and updated as appropriate: allergies, current medications, past family history, past medical history, past social history, past surgical history and problem list.     Past Medical History:   Diagnosis Date    Multiple sclerosis (HCC)     Neurogenic bladder   "      Past Surgical History:   Procedure Laterality Date    KY COLONOSCOPY FLX DX W/COLLJ SPEC WHEN PFRMD N/A 6/8/2018    Procedure: COLONOSCOPY;  Surgeon: Karla Petit MD;  [facility];  Service: Gastroenterology    WISDOM TOOTH EXTRACTION         Family History   Problem Relation Age of Onset    Addiction problem Father         alcohol    Liver disease Father     Heart disease Father     Hypertension Father     Colon cancer Maternal Aunt     Diabetes Maternal Aunt          Medications have been verified.        Objective   /80   Pulse 98   Temp 98 °F (36.7 °C)   Resp 18   Ht 5' 10\" (1.778 m)   Wt 78.5 kg (173 lb)   SpO2 98%   BMI 24.82 kg/m²        Physical Exam     Physical Exam  Vitals reviewed.   Constitutional:       Appearance: Normal appearance.   HENT:      Head: Normocephalic and atraumatic.      Right Ear: Tympanic membrane, ear canal and external ear normal.      Left Ear: Tympanic membrane, ear canal and external ear normal.      Nose: Congestion and rhinorrhea (thick white mucus) present.      Mouth/Throat:      Mouth: Mucous membranes are moist.      Pharynx: Posterior oropharyngeal erythema present. No oropharyngeal exudate.   Cardiovascular:      Rate and Rhythm: Normal rate and regular rhythm.      Pulses: Normal pulses.      Heart sounds: Normal heart sounds. No murmur heard.  Pulmonary:      Effort: Pulmonary effort is normal. No respiratory distress.      Breath sounds: Normal breath sounds. No wheezing.   Musculoskeletal:      Cervical back: Normal range of motion and neck supple.   Lymphadenopathy:      Cervical: No cervical adenopathy.   Skin:     General: Skin is warm and dry.      Capillary Refill: Capillary refill takes less than 2 seconds.      Findings: No rash.   Neurological:      General: No focal deficit present.      Mental Status: She is alert and oriented to person, place, and time.                   "

## 2025-02-27 NOTE — PATIENT INSTRUCTIONS
Rx Zpak, take as directed on package.   Motrin for fever/discomfort.  Stay well hydrated and get rest.  Supportive care.  Follow up with PCP in 3-5 days.

## 2025-03-04 NOTE — TELEPHONE ENCOUNTER
Left detailed msg (per consent in chart) regarding results and recommendations below. Requested return call if patient had recent/current infection or illness.       Labs sent to PCP.     Dr. Gilbert plascencia

## 2025-03-04 NOTE — TELEPHONE ENCOUNTER
Niki Phipps MD to Neurology Multiple Sclerosis Team 3  Regarding results: 2       2/24/25  2:03 PM  Result Note  Let pt and spouse know lfts ok.  However wbc elevated at 12 and increased abs neutrophils.  Any recent or current infection?  Unrelated to her ms meds.  Send copy of labs to pcp. Pt to follow up with pcp

## 2025-03-05 DIAGNOSIS — D72.829 LEUKOCYTOSIS, UNSPECIFIED TYPE: Primary | ICD-10-CM

## 2025-03-27 DIAGNOSIS — G31.84 MILD COGNITIVE IMPAIRMENT: ICD-10-CM

## 2025-03-27 RX ORDER — DONEPEZIL HYDROCHLORIDE 5 MG/1
5 TABLET, FILM COATED ORAL
Qty: 90 TABLET | Refills: 1 | Status: SHIPPED | OUTPATIENT
Start: 2025-03-27

## 2025-03-31 ENCOUNTER — APPOINTMENT (OUTPATIENT)
Dept: LAB | Facility: CLINIC | Age: 55
End: 2025-03-31
Payer: COMMERCIAL

## 2025-03-31 DIAGNOSIS — G35 MULTIPLE SCLEROSIS (HCC): ICD-10-CM

## 2025-03-31 DIAGNOSIS — D72.829 LEUKOCYTOSIS, UNSPECIFIED TYPE: ICD-10-CM

## 2025-03-31 LAB
ALBUMIN SERPL BCG-MCNC: 3.9 G/DL (ref 3.5–5)
ALP SERPL-CCNC: 59 U/L (ref 34–104)
ALT SERPL W P-5'-P-CCNC: 13 U/L (ref 7–52)
AST SERPL W P-5'-P-CCNC: 20 U/L (ref 13–39)
BASOPHILS # BLD AUTO: 0.07 THOUSANDS/ÂΜL (ref 0–0.1)
BASOPHILS NFR BLD AUTO: 1 % (ref 0–1)
BILIRUB DIRECT SERPL-MCNC: 0.11 MG/DL (ref 0–0.2)
BILIRUB SERPL-MCNC: 0.38 MG/DL (ref 0.2–1)
EOSINOPHIL # BLD AUTO: 0.18 THOUSAND/ÂΜL (ref 0–0.61)
EOSINOPHIL NFR BLD AUTO: 2 % (ref 0–6)
ERYTHROCYTE [DISTWIDTH] IN BLOOD BY AUTOMATED COUNT: 14 % (ref 11.6–15.1)
HCT VFR BLD AUTO: 44.5 % (ref 34.8–46.1)
HGB BLD-MCNC: 14 G/DL (ref 11.5–15.4)
IMM GRANULOCYTES # BLD AUTO: 0.03 THOUSAND/UL (ref 0–0.2)
IMM GRANULOCYTES NFR BLD AUTO: 0 % (ref 0–2)
LYMPHOCYTES # BLD AUTO: 2.6 THOUSANDS/ÂΜL (ref 0.6–4.47)
LYMPHOCYTES NFR BLD AUTO: 33 % (ref 14–44)
MCH RBC QN AUTO: 30.5 PG (ref 26.8–34.3)
MCHC RBC AUTO-ENTMCNC: 31.5 G/DL (ref 31.4–37.4)
MCV RBC AUTO: 97 FL (ref 82–98)
MONOCYTES # BLD AUTO: 0.57 THOUSAND/ÂΜL (ref 0.17–1.22)
MONOCYTES NFR BLD AUTO: 7 % (ref 4–12)
NEUTROPHILS # BLD AUTO: 4.5 THOUSANDS/ÂΜL (ref 1.85–7.62)
NEUTS SEG NFR BLD AUTO: 57 % (ref 43–75)
NRBC BLD AUTO-RTO: 0 /100 WBCS
PLATELET # BLD AUTO: 230 THOUSANDS/UL (ref 149–390)
PMV BLD AUTO: 11 FL (ref 8.9–12.7)
PROT SERPL-MCNC: 6.8 G/DL (ref 6.4–8.4)
RBC # BLD AUTO: 4.59 MILLION/UL (ref 3.81–5.12)
WBC # BLD AUTO: 7.95 THOUSAND/UL (ref 4.31–10.16)

## 2025-03-31 PROCEDURE — 36415 COLL VENOUS BLD VENIPUNCTURE: CPT

## 2025-03-31 PROCEDURE — 80076 HEPATIC FUNCTION PANEL: CPT

## 2025-03-31 PROCEDURE — 85025 COMPLETE CBC W/AUTO DIFF WBC: CPT

## 2025-04-01 ENCOUNTER — OFFICE VISIT (OUTPATIENT)
Dept: NEUROLOGY | Facility: CLINIC | Age: 55
End: 2025-04-01
Payer: COMMERCIAL

## 2025-04-01 VITALS
WEIGHT: 173 LBS | DIASTOLIC BLOOD PRESSURE: 80 MMHG | BODY MASS INDEX: 24.77 KG/M2 | HEART RATE: 91 BPM | HEIGHT: 70 IN | OXYGEN SATURATION: 98 % | SYSTOLIC BLOOD PRESSURE: 114 MMHG

## 2025-04-01 DIAGNOSIS — G31.84 MILD COGNITIVE IMPAIRMENT: ICD-10-CM

## 2025-04-01 DIAGNOSIS — G35 MULTIPLE SCLEROSIS (HCC): Primary | ICD-10-CM

## 2025-04-01 DIAGNOSIS — E53.8 B12 DEFICIENCY: ICD-10-CM

## 2025-04-01 PROCEDURE — 99214 OFFICE O/P EST MOD 30 MIN: CPT | Performed by: PSYCHIATRY & NEUROLOGY

## 2025-04-01 NOTE — PROGRESS NOTES
Name: Genesis Castro      : 1970      MRN: [id number]  Encounter Provider: Niki Phipps MD  Encounter Date: 2025   Encounter department: [facility]  :  Assessment & Plan  Multiple sclerosis (HCC)  Here today for MS follow-up, last seen 10/1/2024.  She is accompanied by her .  She has been off Rebif therapy and is currently on teriflunomide, tolerating well.  She reports that her prior paresthesias of the lower extremities have significantly improved and are not as bothersome.  With regards to lab work, patient had CBC back in February which showed leukocytosis however she was dealing with an acute illness at the time.  Most recent lab work yesterday showed WBC and ANC within normal range.  Hepatic function panel was unremarkable as well.  In the interim since her last visit, patient received MRI thoracic spine with and without contrast which showed stable disease and no new lesions.    Plan:  Continue therapy with teriflunomide.  Will obtain CBC with differential and hepatic function testing every 8 weeks starting in May.  Follow-up closely with values for surveillance  Call for any new or worsening symptoms       B12 deficiency  Patient received 3 IM vitamin B12 injections in the past, most recent vitamin B12 level 283 in October.  Patient's  reports that the injections were stopped due to values being within the normal range.  Given her history of mild cognitive impairment, would recommend aiming for more optimal level such as greater than 400.    Plan:  Will order repeat vitamin B12 level and have patient follow-up with PCP regarding results.  If less than 400, strongly recommend reinitiating IM vitamin B12 injections       Mild cognitive impairment  Continue current dose of Aricept at this time.  No reported side effects.  No significant progression in memory concerns.  No issues with safety concerns at home.  Patient was accompanied by  during this  visit.  Recommend optimizing vitamin B12 levels             History of Present Illness     Genesis is a 54-year-old female here today for follow-up regarding MS.  She was last seen 10/1/thousand 24 and at that time she was off Rebif. She is currently taking teriflunomide.  At her last visit, she noted paresthesias of her lower extremities mostly bilateral in her feet.  Today she reports that these sensations have improved.  She denies any other side effects with her medication.  She had an elevated white blood cell count a few weeks ago but this was in the setting of acute illness.  She had an MRI T-spine with and without contrast which was stable since her last visit.  Today she denies any recent hospitalizations, new numbness/weakness, vision changes, bladder/bowel changes.  With regards to memory, she reports doing well overall.  Her  who accompanies her today says occasionally she will forget things especially when she is stressed or her mood will change.  This does not happen enough to overall be concerning he reports.  She is doing well on her current dose of Aricept and denies any side effects.  They will be heading to [facility] in the near future for the summer.  She had a total of 3 IM vitamin B12 injections before being advised to stop by her PCP her  reports.      Review of Systems   Constitutional:  Negative for appetite change, fatigue and fever.   HENT: Negative.  Negative for hearing loss, tinnitus, trouble swallowing and voice change.    Eyes: Negative.  Negative for photophobia, pain and visual disturbance.   Respiratory: Negative.  Negative for shortness of breath.    Cardiovascular: Negative.  Negative for palpitations.   Gastrointestinal: Negative.  Negative for nausea and vomiting.   Endocrine: Negative.  Negative for cold intolerance.   Genitourinary: Negative.  Negative for dysuria, frequency and urgency.   Musculoskeletal:  Negative for back pain, gait problem, myalgias, neck  "pain and neck stiffness.   Skin: Negative.  Negative for rash.   Allergic/Immunologic: Negative.    Neurological:  Positive for numbness (tinging bottom of feet bilateral (meds)). Negative for dizziness, tremors, seizures, syncope, facial asymmetry, speech difficulty, weakness, light-headedness and headaches.   Hematological: Negative.  Does not bruise/bleed easily.   Psychiatric/Behavioral: Negative.  Negative for confusion, hallucinations and sleep disturbance.     I have personally reviewed the MA's review of systems and made changes as necessary.       Objective   /80 (BP Location: Left arm, Patient Position: Sitting, Cuff Size: Large)   Pulse 91   Ht 5' 10\" (1.778 m)   Wt 78.5 kg (173 lb)   LMP  (Exact Date)   SpO2 98%   BMI 24.82 kg/m²     Physical Exam  Eyes:      General: Lids are normal.      Extraocular Movements: Extraocular movements intact.      Pupils: Pupils are equal, round, and reactive to light.   Neurological:      Motor: Motor strength is normal.     Deep Tendon Reflexes:      Reflex Scores:       Bicep reflexes are 2+ on the right side and 2+ on the left side.       Brachioradialis reflexes are 2+ on the right side and 2+ on the left side.       Patellar reflexes are 2+ on the right side and 2+ on the left side.       Achilles reflexes are 2+ on the right side and 2+ on the left side.  Psychiatric:         Speech: Speech normal.       Neurological Exam  Mental Status  Awake, alert and oriented to person, place and time. Speech is normal.  Knew current president and previous one.    Cranial Nerves  CN II: Visual acuity is normal. Visual fields full to confrontation.  CN III, IV, VI: Extraocular movements intact bilaterally. Normal lids and orbits bilaterally. Pupils equal round and reactive to light bilaterally.  CN V: Facial sensation is normal.  CN VII: Full and symmetric facial movement.  CN VIII: Hearing is normal.  CN IX, X: Palate elevates symmetrically. Normal gag reflex.  CN " XI: Shoulder shrug strength is normal.  CN XII: Tongue midline without atrophy or fasciculations.    Motor   No abnormal involuntary movements. Strength is 5/5 throughout all four extremities.    Sensory  Sensation to light touch intact throughout.    Reflexes                                            Right                      Left  Brachioradialis                    2+                         2+  Biceps                                 2+                         2+  Patellar                                2+                         2+  Achilles                                2+                         2+    Right pathological reflexes: Crossed adductor absent.  Left pathological reflexes: Crossed adductor absent.    Coordination  Right: Finger-to-nose normal. Rapid alternating movement normal.Left: Finger-to-nose normal. Rapid alternating movement normal.    Gait  Normal casual, toe, heel and tandem gait.      Administrative Statements   I have spent a total time of 35 minutes in caring for this patient on the day of the visit/encounter including Diagnostic results, Prognosis, Risks and benefits of tx options, Instructions for management, Patient and family education, Importance of tx compliance, Risk factor reductions, Impressions, Counseling / Coordination of care, Documenting in the medical record, Reviewing/placing orders in the medical record (including tests, medications, and/or procedures), and Obtaining or reviewing history  .

## 2025-04-01 NOTE — ASSESSMENT & PLAN NOTE
Here today for MS follow-up, last seen 10/1/2024.  She is accompanied by her .  She has been off Rebif therapy and is currently on teriflunomide, tolerating well.  She reports that her prior paresthesias of the lower extremities have significantly improved and are not as bothersome.  With regards to lab work, patient had CBC back in February which showed leukocytosis however she was dealing with an acute illness at the time.  Most recent lab work yesterday showed WBC and ANC within normal range.  Hepatic function panel was unremarkable as well.  In the interim since her last visit, patient received MRI thoracic spine with and without contrast which showed stable disease and no new lesions.    Plan:  Continue therapy with teriflunomide.  Will obtain CBC with differential and hepatic function testing every 8 weeks starting in May.  Follow-up closely with values for surveillance  Call for any new or worsening symptoms

## 2025-04-01 NOTE — ASSESSMENT & PLAN NOTE
Continue current dose of Aricept at this time.  No reported side effects.  No significant progression in memory concerns.  No issues with safety concerns at home.  Patient was accompanied by  during this visit.  Recommend optimizing vitamin B12 levels

## 2025-04-01 NOTE — ASSESSMENT & PLAN NOTE
Patient received 3 IM vitamin B12 injections in the past, most recent vitamin B12 level 283 in October.  Patient's  reports that the injections were stopped due to values being within the normal range.  Given her history of mild cognitive impairment, would recommend aiming for more optimal level such as greater than 400.    Plan:  Will order repeat vitamin B12 level and have patient follow-up with PCP regarding results.  If less than 400, strongly recommend reinitiating IM vitamin B12 injections

## 2025-04-11 DIAGNOSIS — E53.8 B12 DEFICIENCY: Primary | ICD-10-CM

## 2025-04-11 RX ORDER — CYANOCOBALAMIN, ISOPROPYL ALCOHOL 1000MCG/ML
1 KIT INJECTION
Qty: 1 KIT | Refills: 3 | Status: SHIPPED | OUTPATIENT
Start: 2025-04-11

## 2025-04-11 RX ORDER — CYANOCOBALAMIN, ISOPROPYL ALCOHOL 1000MCG/ML
1 KIT INJECTION
COMMUNITY
End: 2025-04-11 | Stop reason: SDUPTHER

## 2025-04-11 NOTE — TELEPHONE ENCOUNTER
Pt's spouse is inquiring if he is able to pickup the script from [facility] for the pt and do the at home injections as previously used.

## 2025-04-11 NOTE — TELEPHONE ENCOUNTER
Patient is calling stating her Neurologist is suggesting she start getting B12 shots again.  Patient recently had bloodwork done.  Genesis stated she use to get them a year or two ago but Dr. Hunt told her to stop.      Please call patient back to set up.  Thank you

## 2025-05-02 ENCOUNTER — APPOINTMENT (OUTPATIENT)
Dept: LAB | Facility: CLINIC | Age: 55
End: 2025-05-02
Payer: COMMERCIAL

## 2025-05-02 DIAGNOSIS — G35 MULTIPLE SCLEROSIS (HCC): ICD-10-CM

## 2025-05-02 LAB
ALBUMIN SERPL BCG-MCNC: 3.9 G/DL (ref 3.5–5)
ALP SERPL-CCNC: 49 U/L (ref 34–104)
ALT SERPL W P-5'-P-CCNC: 18 U/L (ref 7–52)
AST SERPL W P-5'-P-CCNC: 22 U/L (ref 13–39)
BASOPHILS # BLD AUTO: 0.08 THOUSANDS/ÂΜL (ref 0–0.1)
BASOPHILS NFR BLD AUTO: 1 % (ref 0–1)
BILIRUB DIRECT SERPL-MCNC: 0.01 MG/DL (ref 0–0.2)
BILIRUB SERPL-MCNC: 0.53 MG/DL (ref 0.2–1)
EOSINOPHIL # BLD AUTO: 0.15 THOUSAND/ÂΜL (ref 0–0.61)
EOSINOPHIL NFR BLD AUTO: 2 % (ref 0–6)
ERYTHROCYTE [DISTWIDTH] IN BLOOD BY AUTOMATED COUNT: 13.9 % (ref 11.6–15.1)
HCT VFR BLD AUTO: 45.3 % (ref 34.8–46.1)
HGB BLD-MCNC: 14.4 G/DL (ref 11.5–15.4)
IMM GRANULOCYTES # BLD AUTO: 0.02 THOUSAND/UL (ref 0–0.2)
IMM GRANULOCYTES NFR BLD AUTO: 0 % (ref 0–2)
LYMPHOCYTES # BLD AUTO: 2.42 THOUSANDS/ÂΜL (ref 0.6–4.47)
LYMPHOCYTES NFR BLD AUTO: 31 % (ref 14–44)
MCH RBC QN AUTO: 31.1 PG (ref 26.8–34.3)
MCHC RBC AUTO-ENTMCNC: 31.8 G/DL (ref 31.4–37.4)
MCV RBC AUTO: 98 FL (ref 82–98)
MONOCYTES # BLD AUTO: 0.61 THOUSAND/ÂΜL (ref 0.17–1.22)
MONOCYTES NFR BLD AUTO: 8 % (ref 4–12)
NEUTROPHILS # BLD AUTO: 4.47 THOUSANDS/ÂΜL (ref 1.85–7.62)
NEUTS SEG NFR BLD AUTO: 58 % (ref 43–75)
NRBC BLD AUTO-RTO: 0 /100 WBCS
PLATELET # BLD AUTO: 255 THOUSANDS/UL (ref 149–390)
PMV BLD AUTO: 11.9 FL (ref 8.9–12.7)
PROT SERPL-MCNC: 7 G/DL (ref 6.4–8.4)
RBC # BLD AUTO: 4.63 MILLION/UL (ref 3.81–5.12)
VIT B12 SERPL-MCNC: 567 PG/ML (ref 180–914)
WBC # BLD AUTO: 7.75 THOUSAND/UL (ref 4.31–10.16)

## 2025-05-02 PROCEDURE — 85025 COMPLETE CBC W/AUTO DIFF WBC: CPT

## 2025-05-02 PROCEDURE — 80076 HEPATIC FUNCTION PANEL: CPT

## 2025-05-02 PROCEDURE — 36415 COLL VENOUS BLD VENIPUNCTURE: CPT

## 2025-05-02 PROCEDURE — 82607 VITAMIN B-12: CPT

## 2025-05-18 DIAGNOSIS — G35 MULTIPLE SCLEROSIS (HCC): ICD-10-CM

## 2025-05-19 RX ORDER — TERIFLUNOMIDE 14 MG/1
1 TABLET, FILM COATED ORAL DAILY
Qty: 30 TABLET | Refills: 3 | Status: SHIPPED | OUTPATIENT
Start: 2025-05-19

## 2025-06-09 ENCOUNTER — TELEPHONE (OUTPATIENT)
Age: 55
End: 2025-06-09

## 2025-06-09 DIAGNOSIS — G35 MULTIPLE SCLEROSIS (HCC): Primary | ICD-10-CM

## 2025-06-09 NOTE — TELEPHONE ENCOUNTER
I called and spoke with Mr. Teran, [place] scheduled for 10/28 at 10:30 am with Dr. Phipps; lab order mailed as requested.

## 2025-06-09 NOTE — TELEPHONE ENCOUNTER
Nursing, Let pt know May labs ok.  Continue with monthly labs.  Rx in emr.    Zenobia Prater to help with follow up appt.  Needs appt 6 months from last visit

## 2025-06-09 NOTE — TELEPHONE ENCOUNTER
06/09/25    Patient  Mr. Logan called office today to check if Dr. Phipps schedule at the [facility] was open, due that LOV: 04/01/25 patient was not able to schedule F/U because schedule was not open.    Attempted to schedule at the [place] [facility] with Dr. Phipps but Unfortunately no schedule.    Please Review and Assist if possible.  Thank You.      NOTE:  Patient has only seen Dr. Phipps at the [facility] and Per Patient and  Request would like to keep all her f/u at the [facility].      TO THE NEURO POD TEAM:  Mr. Logan would like to know when patient haves to have Blood Work done, and if she is due for any blood work before appt f/u, please place order if appropriate.     Per Mr. patient next f/u should be schedule by the end of September or beginning of October.

## 2025-07-24 ENCOUNTER — HOSPITAL ENCOUNTER (OUTPATIENT)
Dept: HOSPITAL 53 - M WUC | Age: 55
End: 2025-07-24
Attending: PSYCHIATRY & NEUROLOGY
Payer: COMMERCIAL

## 2025-07-24 DIAGNOSIS — G35: Primary | ICD-10-CM

## 2025-07-24 LAB
ALBUMIN SERPL BCG-MCNC: 3.4 G/DL (ref 3.2–5.2)
ALP SERPL-CCNC: 57 U/L (ref 35–104)
ALT SERPL W P-5'-P-CCNC: 29 U/L (ref 7–40)
AST SERPL-CCNC: 31 U/L (ref ?–34)
BASOPHILS # BLD AUTO: 0.1 10^3/UL (ref 0–0.2)
BASOPHILS NFR BLD AUTO: 1 % (ref 0–1)
BILIRUB CONJ SERPL-MCNC: 0.1 MG/DL (ref ?–0.4)
BILIRUB SERPL-MCNC: 0.6 MG/DL (ref 0.3–1.2)
EOSINOPHIL # BLD AUTO: 0.1 10^3/UL (ref 0–0.5)
EOSINOPHIL NFR BLD AUTO: 1.3 % (ref 0–3)
HCT VFR BLD AUTO: 42.4 % (ref 36–47)
HGB BLD-MCNC: 13.6 G/DL (ref 12–15.5)
LYMPHOCYTES # BLD AUTO: 1.8 10^3/UL (ref 1.5–5)
LYMPHOCYTES NFR BLD AUTO: 27.5 % (ref 24–44)
MCH RBC QN AUTO: 31.2 PG (ref 27–33)
MCHC RBC AUTO-ENTMCNC: 32.1 G/DL (ref 32–36.5)
MCV RBC AUTO: 97.2 FL (ref 80–96)
MONOCYTES # BLD AUTO: 0.6 10^3/UL (ref 0–0.8)
MONOCYTES NFR BLD AUTO: 9.4 % (ref 2–8)
NEUTROPHILS # BLD AUTO: 4.1 10^3/UL (ref 1.5–8.5)
NEUTROPHILS NFR BLD AUTO: 60.7 % (ref 36–66)
PLATELET # BLD AUTO: 262 10^3/UL (ref 150–450)
PROT SERPL-MCNC: 6.6 G/DL (ref 5.7–8.2)
RBC # BLD AUTO: 4.36 10^6/UL (ref 4–5.4)
WBC # BLD AUTO: 6.7 10^3/UL (ref 4–10)